# Patient Record
Sex: FEMALE | Race: WHITE | NOT HISPANIC OR LATINO | Employment: OTHER | URBAN - METROPOLITAN AREA
[De-identification: names, ages, dates, MRNs, and addresses within clinical notes are randomized per-mention and may not be internally consistent; named-entity substitution may affect disease eponyms.]

---

## 2018-03-12 ENCOUNTER — HISTORICAL (OUTPATIENT)
Dept: INFUSION THERAPY | Facility: HOSPITAL | Age: 83
End: 2018-03-12

## 2019-03-08 ENCOUNTER — HISTORICAL (OUTPATIENT)
Dept: ADMINISTRATIVE | Facility: HOSPITAL | Age: 84
End: 2019-03-08

## 2019-03-11 ENCOUNTER — HISTORICAL (OUTPATIENT)
Dept: ADMINISTRATIVE | Facility: HOSPITAL | Age: 84
End: 2019-03-11

## 2019-03-11 LAB
ABS NEUT (OLG): 6.52 X10(3)/MCL (ref 2.1–9.2)
ALBUMIN SERPL-MCNC: 3.6 GM/DL (ref 3.4–5)
ALBUMIN/GLOB SERPL: 1 RATIO (ref 1.1–2)
ALP SERPL-CCNC: 60 UNIT/L (ref 38–126)
ALT SERPL-CCNC: 13 UNIT/L (ref 12–78)
AST SERPL-CCNC: 9 UNIT/L (ref 15–37)
BASOPHILS # BLD AUTO: 0.1 X10(3)/MCL (ref 0–0.2)
BASOPHILS NFR BLD AUTO: 0.8 %
BILIRUB SERPL-MCNC: 0.5 MG/DL (ref 0.2–1)
BILIRUBIN DIRECT+TOT PNL SERPL-MCNC: 0.1 MG/DL (ref 0–0.5)
BILIRUBIN DIRECT+TOT PNL SERPL-MCNC: 0.4 MG/DL (ref 0–0.8)
BUN SERPL-MCNC: 21 MG/DL (ref 7–18)
CALCIUM SERPL-MCNC: 8.9 MG/DL (ref 8.5–10.1)
CHLORIDE SERPL-SCNC: 105 MMOL/L (ref 98–107)
CO2 SERPL-SCNC: 29 MMOL/L (ref 21–32)
CREAT SERPL-MCNC: 0.91 MG/DL (ref 0.55–1.02)
EOSINOPHIL # BLD AUTO: 0.4 X10(3)/MCL (ref 0–0.9)
EOSINOPHIL NFR BLD AUTO: 3.8 %
ERYTHROCYTE [DISTWIDTH] IN BLOOD BY AUTOMATED COUNT: 14.4 % (ref 11.5–17)
GLOBULIN SER-MCNC: 3.5 GM/DL (ref 2.4–3.5)
GLUCOSE SERPL-MCNC: 94 MG/DL (ref 74–106)
HCT VFR BLD AUTO: 41.7 % (ref 37–47)
HGB BLD-MCNC: 13 GM/DL (ref 12–16)
LYMPHOCYTES # BLD AUTO: 2.4 X10(3)/MCL (ref 0.6–4.6)
LYMPHOCYTES NFR BLD AUTO: 23.3 %
MCH RBC QN AUTO: 25.6 PG (ref 27–31)
MCHC RBC AUTO-ENTMCNC: 31.2 GM/DL (ref 33–36)
MCV RBC AUTO: 82.2 FL (ref 80–94)
MONOCYTES # BLD AUTO: 0.9 X10(3)/MCL (ref 0.1–1.3)
MONOCYTES NFR BLD AUTO: 8.8 %
NEUTROPHILS # BLD AUTO: 6.5 X10(3)/MCL (ref 2.1–9.2)
NEUTROPHILS NFR BLD AUTO: 62.6 %
PLATELET # BLD AUTO: 259 X10(3)/MCL (ref 130–400)
PMV BLD AUTO: 10.4 FL (ref 9.4–12.4)
POTASSIUM SERPL-SCNC: 3.8 MMOL/L (ref 3.5–5.1)
PROT SERPL-MCNC: 7.1 GM/DL (ref 6.4–8.2)
RBC # BLD AUTO: 5.07 X10(6)/MCL (ref 4.2–5.4)
SODIUM SERPL-SCNC: 139 MMOL/L (ref 136–145)
WBC # SPEC AUTO: 10.4 X10(3)/MCL (ref 4.5–11.5)

## 2019-09-11 ENCOUNTER — HISTORICAL (OUTPATIENT)
Dept: ADMINISTRATIVE | Facility: HOSPITAL | Age: 84
End: 2019-09-11

## 2019-09-19 ENCOUNTER — HISTORICAL (OUTPATIENT)
Dept: CARDIOLOGY | Facility: HOSPITAL | Age: 84
End: 2019-09-19

## 2019-12-09 ENCOUNTER — HISTORICAL (OUTPATIENT)
Dept: ADMINISTRATIVE | Facility: HOSPITAL | Age: 84
End: 2019-12-09

## 2019-12-17 ENCOUNTER — HISTORICAL (OUTPATIENT)
Dept: RADIOLOGY | Facility: HOSPITAL | Age: 84
End: 2019-12-17

## 2019-12-23 ENCOUNTER — HISTORICAL (OUTPATIENT)
Dept: LAB | Facility: HOSPITAL | Age: 84
End: 2019-12-23

## 2020-01-15 ENCOUNTER — HISTORICAL (OUTPATIENT)
Dept: RADIOLOGY | Facility: HOSPITAL | Age: 85
End: 2020-01-15

## 2020-03-06 ENCOUNTER — HISTORICAL (OUTPATIENT)
Dept: HEMATOLOGY/ONCOLOGY | Facility: CLINIC | Age: 85
End: 2020-03-06

## 2020-03-06 LAB
ABS NEUT (OLG): 6.07 X10(3)/MCL (ref 2.1–9.2)
ALBUMIN SERPL-MCNC: 3.6 GM/DL (ref 3.4–5)
ALBUMIN/GLOB SERPL: 1 {RATIO}
ALP SERPL-CCNC: 63 UNIT/L (ref 38–126)
ALT SERPL-CCNC: 19 UNIT/L (ref 12–78)
AST SERPL-CCNC: 13 UNIT/L (ref 15–37)
BASOPHILS # BLD AUTO: 0.1 X10(3)/MCL (ref 0–0.2)
BASOPHILS NFR BLD AUTO: 0.8 %
BILIRUB SERPL-MCNC: 0.5 MG/DL (ref 0.2–1)
BILIRUBIN DIRECT+TOT PNL SERPL-MCNC: 0.1 MG/DL (ref 0–0.2)
BILIRUBIN DIRECT+TOT PNL SERPL-MCNC: 0.4 MG/DL (ref 0–0.8)
BUN SERPL-MCNC: 17 MG/DL (ref 7–18)
CALCIUM SERPL-MCNC: 9.2 MG/DL (ref 8.5–10.1)
CHLORIDE SERPL-SCNC: 109 MMOL/L (ref 98–107)
CO2 SERPL-SCNC: 25 MMOL/L (ref 21–32)
CREAT SERPL-MCNC: 0.83 MG/DL (ref 0.55–1.02)
EOSINOPHIL # BLD AUTO: 0.4 X10(3)/MCL (ref 0–0.9)
EOSINOPHIL NFR BLD AUTO: 4.1 %
ERYTHROCYTE [DISTWIDTH] IN BLOOD BY AUTOMATED COUNT: 15.7 % (ref 11.5–17)
GLOBULIN SER-MCNC: 3.7 GM/DL (ref 2.4–3.5)
GLUCOSE SERPL-MCNC: 87 MG/DL (ref 74–106)
HCT VFR BLD AUTO: 40.7 % (ref 37–47)
HGB BLD-MCNC: 12.6 GM/DL (ref 12–16)
LYMPHOCYTES # BLD AUTO: 2.2 X10(3)/MCL (ref 0.6–4.6)
LYMPHOCYTES NFR BLD AUTO: 22.8 %
MCH RBC QN AUTO: 24.4 PG (ref 27–31)
MCHC RBC AUTO-ENTMCNC: 31 GM/DL (ref 33–36)
MCV RBC AUTO: 78.9 FL (ref 80–94)
MONOCYTES # BLD AUTO: 0.9 X10(3)/MCL (ref 0.1–1.3)
MONOCYTES NFR BLD AUTO: 9.2 %
NEUTROPHILS # BLD AUTO: 6.1 X10(3)/MCL (ref 2.1–9.2)
NEUTROPHILS NFR BLD AUTO: 62.5 %
PLATELET # BLD AUTO: 223 X10(3)/MCL (ref 130–400)
PMV BLD AUTO: 9.7 FL (ref 9.4–12.4)
POTASSIUM SERPL-SCNC: 4.1 MMOL/L (ref 3.5–5.1)
PROT SERPL-MCNC: 7.3 GM/DL (ref 6.4–8.2)
RBC # BLD AUTO: 5.16 X10(6)/MCL (ref 4.2–5.4)
SODIUM SERPL-SCNC: 141 MMOL/L (ref 136–145)
WBC # SPEC AUTO: 9.7 X10(3)/MCL (ref 4.5–11.5)

## 2020-12-21 ENCOUNTER — HISTORICAL (OUTPATIENT)
Dept: RADIOLOGY | Facility: HOSPITAL | Age: 85
End: 2020-12-21

## 2021-03-15 ENCOUNTER — HISTORICAL (OUTPATIENT)
Dept: ADMINISTRATIVE | Facility: HOSPITAL | Age: 86
End: 2021-03-15

## 2022-03-17 ENCOUNTER — HISTORICAL (OUTPATIENT)
Dept: LAB | Facility: HOSPITAL | Age: 87
End: 2022-03-17

## 2022-03-17 ENCOUNTER — HISTORICAL (OUTPATIENT)
Dept: ADMINISTRATIVE | Facility: HOSPITAL | Age: 87
End: 2022-03-17

## 2022-04-11 ENCOUNTER — HISTORICAL (OUTPATIENT)
Dept: ADMINISTRATIVE | Facility: HOSPITAL | Age: 87
End: 2022-04-11

## 2022-04-29 VITALS
SYSTOLIC BLOOD PRESSURE: 125 MMHG | DIASTOLIC BLOOD PRESSURE: 73 MMHG | BODY MASS INDEX: 25.86 KG/M2 | HEIGHT: 66 IN | WEIGHT: 160.94 LBS

## 2022-04-30 NOTE — PROGRESS NOTES
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 CT of the chest showed a suspicious right upper lobe mass. Bronchoscopy was nondiagnostic. CT-guided biopsy  showed no definite malignancy. Close observation and follow-up was recommended. CT PET scan 10/8/12 showed a hypermetabolic right upper lobe mass measuring 2.9 x 2.6 and meters with an SUV of 15.6. There were 2 smaller satellite lesions and bilateral borderline hilar lymph nodes. She underwent right upper lobectomy 10/23/12. Pathology revealed a 4 cm carcinosarcoma invading visceral pleura with clear bronchial margins. Right hilar node demonstrated sclerosed granuloma with no evidence of malignancy. 6 resected mediastinal nodes were negative for involvement. She was followed on surveillance by Dr. Velma Craft with no evidence of disease recurrence. Her last follow-up visit was 17.    She presents today accompanied by her daughter to establish ongoing Oncology follow-up. She is now nearly 5-1/2 years out from her initial diagnosis and surgical resection. She reports mild dyspnea with moderate exertion. No chest pain, cough, sputum production or hemoptysis. She denies any weight loss. Since her previous surveillance visit with Dr. Malone, she was diagnosed and treated for Lyme disease. She denies any hospitalizations.      Chief Complaint   I'm doing well      Review of Systems   Constitutional:  No weight loss, No fever, No chills, No sweats, No weakness, No fatigue.    Eye:  No icterus, No blurring, No  double vision.    Ear/Nose/Mouth/Throat:  No nasal congestion, No sore throat.    Respiratory:  Shortness of breath (Mild MITCHELL), No cough, No sputum production, No hemoptysis, No wheezing.    Cardiovascular:  No chest pain, No palpitations, No tachycardia.    Gastrointestinal:  No nausea, No vomiting, No diarrhea, No constipation, No abdominal pain.    Genitourinary:  No dysuria, No hematuria, No change in urine stream.    Hematology/Lymphatics:  No bruising tendency, No bleeding tendency, No swollen lymph glands.    Musculoskeletal:  Joint pain, No lower extremity edema, No back pain.    Integumentary:  No rash, No pruritus, No skin lesion.    Neurologic:  Alert and oriented X4, No abnormal balance, No confusion, No numbness, No tingling.    Psychiatric:  No anxiety, No depression.       Health Status   Allergies:    Allergic Reactions (Selected)  Severity Not Documented  Dilaudid- Nausea.  Morphine- Rash.  Penicillin- Rash.  Red dye- No reactions were documented.  Strawberries- No reactions were documented.   Current medications:  (Selected)   Documented Medications  Documented  Fish Oil oral capsule: 1 cap(s), Oral, Daily, 0 Refill(s)  LORAzepam 0.5 mg oral tablet: 0.5 mg = 1 tab(s), Oral, BID, PRN for anxiety, 0 Refill(s)  Vitamin B12 1000 mcg oral tablet: 1,000 mcg = 1 tab(s), Oral, Daily, # 30 tab(s), 0 Refill(s)  acetaminophen 325 mg oral tablet: 650 mg = 2 tab(s), Oral, q4hr, PRN headache, 0 Refill(s)  aspirin 81 mg oral Delayed Release (EC) tablet: 81 mg = 1 tab(s), Oral, Daily, # 30 tab(s), 0 Refill(s)  bisoprolol 5 mg oral tablet: 10 mg = 2 tab(s), Oral, BID, 0 Refill(s)  cholecalciferol 400 intl units oral tablet: 400 IntUnit = 1 tab(s), Oral, Daily, # 30 tab(s), 0 Refill(s)  citalopram 20 mg oral tablet: 40 mg = 2 tab(s), Oral, Daily, # 30 tab(s), 0 Refill(s)  levothyroxine 75 mcg (0.075 mg) oral tablet: 75 mcg = 1 tab(s), Oral, Daily, # 30 tab(s), 0 Refill(s)  losartan 25 mg oral tablet: 25 mg = 1  tab(s), Oral, Daily, # 30 tab(s), 0 Refill(s)      Histories     PMHx:  HTN, hyperlipidemia, hypothyroidism, A-fib, MVP, arthritis, kidney stones, pelvic mass    PSHx:  Thyroidectomy, cholecystectomy, right total knee replacement, appendectomy, hysterectomy, right upper lobectomy 10/12, cataracts    SH:  Lifetime nonsmoker, no significant alcohol use. 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    FH:  Her mother had ovarian cancer, a brother had lung cancer.      Physical Examination   Vital Signs   3/12/2018 13:22 CDT      Temperature Oral          36.8 DegC                             Temperature Oral (calculated)             98.24 DegF                             Peripheral Pulse Rate     79 bpm                             Systolic Blood Pressure   139 mmHg                             Diastolic Blood Pressure  74 mmHg     General:  Alert and oriented, Elderly, well-developed white female in no acute distress.    Eye:  Pupils are equal, round and reactive to light, Extraocular movements are intact, Normal conjunctiva, Sclera nonicteric.    HENT:  Normocephalic, Oral mucosa is moist, No pharyngeal erythema.    Neck:  Supple, Non-tender, No jugular venous distention, No lymphadenopathy.    Respiratory:  Respirations are non-labored, Breath sounds are equal, Lungs clear to auscultation throughout. Well-healed right thoracotomy incision.    Cardiovascular:  Normal rate, Regular rhythm, No murmur.    Gastrointestinal:  Soft, Non-tender, Non-distended, Normal bowel sounds, No palpable masses or organomegaly.    Lymphatics:  No lymphadenopathy neck, axilla, groin.    Musculoskeletal:  Normal range of motion, Normal strength, No lower extremity edema.    Integumentary:  Warm, Dry, No rash.    Neurologic:  Alert, Oriented, Normal sensory, Normal motor function, No focal deficits, Cranial Nerves II-XII are grossly intact.    ECOG Performance Scale: 1- Strenuous physical activity restricted; fully ambulatory and able  to carry out light work.      Review / Management   Laboratory Results   No new laboratory for review      Impression and Plan   IMPRESSION:  Stage IB carcinosarcoma of the right lung s/p resection 10/12 - RICHARD    PLAN:  Patient is now nearly 5-1/2 years out from her initial diagnosis and surgical resection for early stage lung cancer.  She takes the flu shot every year. She has never had the pneumococcal vaccine. She will receive Pneumovax in the office today.  She will be referred for a PA/lateral chest x-ray today for surveillance. I will notify her of any abnormal results.  Barring any problems, she will be scheduled to return in one year for a follow-up visit and clinical exam with a repeat chest x-ray.      MARGO WILLS M.D.      Other Physicians  Dr. Velma Freitas

## 2023-04-06 ENCOUNTER — HOSPITAL ENCOUNTER (EMERGENCY)
Facility: HOSPITAL | Age: 88
Discharge: HOME | End: 2023-04-06
Attending: STUDENT IN AN ORGANIZED HEALTH CARE EDUCATION/TRAINING PROGRAM
Payer: MEDICARE

## 2023-04-06 VITALS
RESPIRATION RATE: 21 BRPM | WEIGHT: 160 LBS | TEMPERATURE: 98 F | HEIGHT: 66 IN | HEART RATE: 83 BPM | SYSTOLIC BLOOD PRESSURE: 137 MMHG | DIASTOLIC BLOOD PRESSURE: 84 MMHG | BODY MASS INDEX: 25.71 KG/M2 | OXYGEN SATURATION: 96 %

## 2023-04-06 DIAGNOSIS — R11.2 NAUSEA AND VOMITING, UNSPECIFIED VOMITING TYPE: ICD-10-CM

## 2023-04-06 DIAGNOSIS — R42 DIZZINESS: ICD-10-CM

## 2023-04-06 DIAGNOSIS — R42 VERTIGO: Primary | ICD-10-CM

## 2023-04-06 LAB
ALBUMIN SERPL-MCNC: 4.3 G/DL (ref 3.4–4.8)
ALBUMIN/GLOB SERPL: 1 RATIO (ref 1.1–2)
ALP SERPL-CCNC: 57 UNIT/L (ref 40–150)
ALT SERPL-CCNC: 13 UNIT/L (ref 0–55)
APTT PPP: 31.7 SECONDS (ref 23.2–33.7)
AST SERPL-CCNC: 27 UNIT/L (ref 5–34)
BASOPHILS # BLD AUTO: 0.05 X10(3)/MCL (ref 0–0.2)
BASOPHILS NFR BLD AUTO: 0.4 %
BILIRUBIN DIRECT+TOT PNL SERPL-MCNC: 1.2 MG/DL
BNP BLD-MCNC: 230.2 PG/ML
BUN SERPL-MCNC: 15.7 MG/DL (ref 9.8–20.1)
CALCIUM SERPL-MCNC: 9.8 MG/DL (ref 8.4–10.2)
CHLORIDE SERPL-SCNC: 105 MMOL/L (ref 98–111)
CO2 SERPL-SCNC: 21 MMOL/L (ref 23–31)
CREAT SERPL-MCNC: 0.89 MG/DL (ref 0.55–1.02)
EOSINOPHIL # BLD AUTO: 0.03 X10(3)/MCL (ref 0–0.9)
EOSINOPHIL NFR BLD AUTO: 0.2 %
ERYTHROCYTE [DISTWIDTH] IN BLOOD BY AUTOMATED COUNT: 15.2 % (ref 11.5–17)
FLUAV AG UPPER RESP QL IA.RAPID: NOT DETECTED
FLUBV AG UPPER RESP QL IA.RAPID: NOT DETECTED
GFR SERPLBLD CREATININE-BSD FMLA CKD-EPI: >60 MLS/MIN/1.73/M2
GLOBULIN SER-MCNC: 4.2 GM/DL (ref 2.4–3.5)
GLUCOSE SERPL-MCNC: 123 MG/DL (ref 75–121)
HCT VFR BLD AUTO: 47.1 % (ref 37–47)
HGB BLD-MCNC: 15 G/DL (ref 12–16)
IMM GRANULOCYTES # BLD AUTO: 0.07 X10(3)/MCL (ref 0–0.04)
IMM GRANULOCYTES NFR BLD AUTO: 0.5 %
INR BLD: 1.07 (ref 0–1.3)
LYMPHOCYTES # BLD AUTO: 1.18 X10(3)/MCL (ref 0.6–4.6)
LYMPHOCYTES NFR BLD AUTO: 8.7 %
MAGNESIUM SERPL-MCNC: 1.6 MG/DL (ref 1.6–2.6)
MCH RBC QN AUTO: 25.6 PG (ref 27–31)
MCHC RBC AUTO-ENTMCNC: 31.8 G/DL (ref 33–36)
MCV RBC AUTO: 80.2 FL (ref 80–94)
MONOCYTES # BLD AUTO: 0.49 X10(3)/MCL (ref 0.1–1.3)
MONOCYTES NFR BLD AUTO: 3.6 %
NEUTROPHILS # BLD AUTO: 11.82 X10(3)/MCL (ref 2.1–9.2)
NEUTROPHILS NFR BLD AUTO: 86.6 %
NRBC BLD AUTO-RTO: 0 %
PLATELET # BLD AUTO: 155 X10(3)/MCL (ref 130–400)
PMV BLD AUTO: 10.6 FL (ref 7.4–10.4)
POTASSIUM SERPL-SCNC: 4.9 MMOL/L (ref 3.5–5.1)
PROT SERPL-MCNC: 8.5 GM/DL (ref 5.8–7.6)
PROTHROMBIN TIME: 13.8 SECONDS (ref 12.5–14.5)
RBC # BLD AUTO: 5.87 X10(6)/MCL (ref 4.2–5.4)
SARS-COV-2 RNA RESP QL NAA+PROBE: NOT DETECTED
SODIUM SERPL-SCNC: 137 MMOL/L (ref 132–146)
TROPONIN I SERPL-MCNC: <0.01 NG/ML (ref 0–0.04)
WBC # SPEC AUTO: 13.6 X10(3)/MCL (ref 4.5–11.5)

## 2023-04-06 PROCEDURE — 96374 THER/PROPH/DIAG INJ IV PUSH: CPT

## 2023-04-06 PROCEDURE — 80053 COMPREHEN METABOLIC PANEL: CPT | Performed by: PHYSICIAN ASSISTANT

## 2023-04-06 PROCEDURE — 93005 ELECTROCARDIOGRAM TRACING: CPT

## 2023-04-06 PROCEDURE — 93010 EKG 12-LEAD: ICD-10-PCS | Mod: ,,, | Performed by: INTERNAL MEDICINE

## 2023-04-06 PROCEDURE — 83735 ASSAY OF MAGNESIUM: CPT | Performed by: PHYSICIAN ASSISTANT

## 2023-04-06 PROCEDURE — 83880 ASSAY OF NATRIURETIC PEPTIDE: CPT | Performed by: PHYSICIAN ASSISTANT

## 2023-04-06 PROCEDURE — 99285 EMERGENCY DEPT VISIT HI MDM: CPT | Mod: 25

## 2023-04-06 PROCEDURE — 93010 ELECTROCARDIOGRAM REPORT: CPT | Mod: ,,, | Performed by: INTERNAL MEDICINE

## 2023-04-06 PROCEDURE — 84484 ASSAY OF TROPONIN QUANT: CPT | Performed by: PHYSICIAN ASSISTANT

## 2023-04-06 PROCEDURE — 85730 THROMBOPLASTIN TIME PARTIAL: CPT | Performed by: PHYSICIAN ASSISTANT

## 2023-04-06 PROCEDURE — 63600175 PHARM REV CODE 636 W HCPCS: Performed by: PHYSICIAN ASSISTANT

## 2023-04-06 PROCEDURE — 0240U COVID/FLU A&B PCR: CPT | Performed by: PHYSICIAN ASSISTANT

## 2023-04-06 PROCEDURE — 85610 PROTHROMBIN TIME: CPT | Performed by: PHYSICIAN ASSISTANT

## 2023-04-06 PROCEDURE — 85025 COMPLETE CBC W/AUTO DIFF WBC: CPT | Performed by: PHYSICIAN ASSISTANT

## 2023-04-06 RX ORDER — ONDANSETRON 2 MG/ML
4 INJECTION INTRAMUSCULAR; INTRAVENOUS
Status: COMPLETED | OUTPATIENT
Start: 2023-04-06 | End: 2023-04-06

## 2023-04-06 RX ORDER — ONDANSETRON 4 MG/1
4 TABLET, ORALLY DISINTEGRATING ORAL EVERY 8 HOURS PRN
Qty: 10 TABLET | Refills: 0 | OUTPATIENT
Start: 2023-04-06 | End: 2023-04-06 | Stop reason: SDUPTHER

## 2023-04-06 RX ORDER — ONDANSETRON 4 MG/1
4 TABLET, ORALLY DISINTEGRATING ORAL EVERY 8 HOURS PRN
Qty: 10 TABLET | Refills: 0 | Status: SHIPPED | OUTPATIENT
Start: 2023-04-06

## 2023-04-06 RX ADMIN — ONDANSETRON 4 MG: 2 INJECTION INTRAMUSCULAR; INTRAVENOUS at 02:04

## 2023-04-06 NOTE — ED PROVIDER NOTES
Encounter Date: 4/6/2023    SCRIBE #1 NOTE: I, Kathie Arnold, am scribing for, and in the presence of,  Jimi Wesley MD. I have scribed the following portions of the note - Other sections scribed: HPI, ROS, PE.     History     Chief Complaint   Patient presents with    Vomiting     C/o vomiting, weakness & dizziness that started last night. Also reports intermittent chest pain & SOB. Denies abdominal pain. On Eliquis. Hx CHF & MI x2.     Patient is a 89 year old female with a history of A-fib, HTN, and CHF is presenting to the ED for vomiting. The pt states that she has had episode of vomiting and dizziness beginning around midnight last night. She states that it has since improved. She reports having history of vertigo. She denies any headache, chest pain, new shortness of breath, fever, diarrhea, or dysuria. The pt is currently on Eliquis and meclizine. She denies taking any meclizine today.     The history is provided by the patient and a caregiver.   Vomiting   This is a new problem. The current episode started today. The problem occurs 2 - 4 times per day. The problem has been resolved. Pertinent negatives include no abdominal pain and no fever.   Review of patient's allergies indicates:   Allergen Reactions    Sulfa (sulfonamide antibiotics)     Penicillins Rash     History reviewed. No pertinent past medical history.  History reviewed. No pertinent surgical history.  History reviewed. No pertinent family history.     Review of Systems   Constitutional:  Negative for fever.   HENT:  Negative for sore throat.    Eyes:  Negative for visual disturbance.   Respiratory:  Negative for shortness of breath.    Cardiovascular:  Negative for chest pain.   Gastrointestinal:  Positive for vomiting. Negative for abdominal pain.   Genitourinary:  Negative for dysuria.   Musculoskeletal:  Negative for joint swelling.   Skin:  Negative for rash.   Neurological:  Positive for dizziness. Negative for weakness.    Psychiatric/Behavioral:  Negative for confusion.      Physical Exam     Initial Vitals   BP Pulse Resp Temp SpO2   04/06/23 1307 04/06/23 1304 04/06/23 1304 04/06/23 1304 04/06/23 1304   (!) 163/85 79 18 97.7 °F (36.5 °C) 97 %      MAP       --                Physical Exam    Nursing note and vitals reviewed.  Constitutional: She appears well-developed and well-nourished.   HENT:   Head: Normocephalic and atraumatic.   Eyes: EOM are normal. Pupils are equal, round, and reactive to light.   Neck:   Normal range of motion.  Cardiovascular:  Normal rate, normal heart sounds and intact distal pulses. A regularly irregular rhythm present.           No murmur heard.  Pulmonary/Chest: Breath sounds normal. No respiratory distress. She has no wheezes. She has no rales.   Abdominal: Abdomen is soft. She exhibits no distension. There is no abdominal tenderness. There is no rebound.   Musculoskeletal:         General: No tenderness or edema. Normal range of motion.      Cervical back: Normal range of motion.     Neurological: She is alert. She has normal strength. She displays normal reflexes. No cranial nerve deficit. GCS score is 15. GCS eye subscore is 4. GCS verbal subscore is 5. GCS motor subscore is 6.   5/5 upper and lower extremity strength.  Normal cerebellar function.  Normal finger-nose.  Normal gait.   Skin: Skin is warm and dry. Capillary refill takes less than 2 seconds. No rash noted. No erythema.   Psychiatric: She has a normal mood and affect.       ED Course   Procedures  Labs Reviewed   COMPREHENSIVE METABOLIC PANEL - Abnormal; Notable for the following components:       Result Value    Carbon Dioxide 21 (*)     Glucose Level 123 (*)     Protein Total 8.5 (*)     Globulin 4.2 (*)     Albumin/Globulin Ratio 1.0 (*)     All other components within normal limits   B-TYPE NATRIURETIC PEPTIDE - Abnormal; Notable for the following components:    Natriuretic Peptide 230.2 (*)     All other components within  normal limits   CBC WITH DIFFERENTIAL - Abnormal; Notable for the following components:    WBC 13.6 (*)     RBC 5.87 (*)     Hct 47.1 (*)     MCH 25.6 (*)     MCHC 31.8 (*)     MPV 10.6 (*)     Neut # 11.82 (*)     IG# 0.07 (*)     All other components within normal limits   TROPONIN I - Normal   COVID/FLU A&B PCR - Normal    Narrative:     The Xpert Xpress SARS-CoV-2/FLU/RSV plus is a rapid, multiplexed real-time PCR test intended for the simultaneous qualitative detection and differentiation of SARS-CoV-2, Influenza A, Influenza B, and respiratory syncytial virus (RSV) viral RNA in either nasopharyngeal swab or nasal swab specimens.         APTT - Normal   PROTIME-INR - Normal   MAGNESIUM - Normal   CBC W/ AUTO DIFFERENTIAL    Narrative:     The following orders were created for panel order CBC auto differential.  Procedure                               Abnormality         Status                     ---------                               -----------         ------                     CBC with Differential[[id number]]        Abnormal            Final result                 Please view results for these tests on the individual orders.     EKG Readings: (Independently Interpreted)   Initial Reading: No STEMI. Rhythm: Atrial Fibrillation. Heart Rate: 82. Ectopy: No Ectopy. Conduction: Normal. ST Segments: Normal ST Segments. T Waves: Normal. Axis: Left Axis Deviation.   1308   ECG Results              EKG 12-lead (Final result)  Result time 04/06/23 16:55:32      Final result by Interface, Lab In [facility] (04/06/23 16:55:32)                   Narrative:    Test Reason : R42,    Vent. Rate : 082 BPM     Atrial Rate : 000 BPM     P-R Int : 000 ms          QRS Dur : 074 ms      QT Int : 398 ms       P-R-T Axes : 000 -33 031 degrees     QTc Int : 464 ms    Atrial fibrillation  Left axis deviation  Septal infarct ,age undetermined  Abnormal ECG  No previous ECGs available  Confirmed by Saeed Mercado MD ([id number]) on 4/6/2023  4:55:23 PM    Referred By:             Confirmed By:Saeed Mercado MD                                  Imaging Results              CT Head Without Contrast (Final result)  Result time 04/06/23 13:35:41      Final result by Pascual Anderson MD (04/06/23 13:35:41)                   Impression:      No acute intracranial abnormality identified.  Findings of chronic microvascular ischemic disease.      Electronically signed by: Pascual Anderson  Date:    04/06/2023  Time:    13:35               Narrative:    EXAMINATION:  CT HEAD WITHOUT CONTRAST    CLINICAL HISTORY:  Dizziness, persistent/recurrent, cardiac or vascular cause suspected;    TECHNIQUE:  Low dose axial images were obtained through the head.  Coronal and sagittal reformations were also performed. Contrast was not administered.    Automatic exposure control was utilized to reduce the patient's radiation dose.    DLP= 959    COMPARISON:  11/14/2020    FINDINGS:  No acute intracranial hemorrhage, edema or mass. No acute parenchymal abnormality.    Diffuse cerebral atrophy with concordant ventricular enlargement.    There is normal gray white differentiation.    The osseous structures are normal.    The mastoid air cells are clear.    The auditory canals are patent bilaterally.    The globes and orbital contents are normal bilaterally.    The visualized maxillary, ethmoid and sphenoid sinuses are clear.                                       X-Ray Chest PA And Lateral (Final result)  Result time 04/06/23 13:20:07      Final result by Jennifer Guzman MD (04/06/23 13:20:07)                   Impression:      No acute abnormality of the chest.      Electronically signed by: Jennifer Guzman  Date:    04/06/2023  Time:    13:20               Narrative:    EXAMINATION:  XR CHEST PA AND LATERAL    CLINICAL HISTORY:  Chest pain, unspecified    TECHNIQUE:  PA and lateral chest radiographs    COMPARISON:  Chest x-ray dated 03/17/2022    FINDINGS:  The heart is normal in  size.  There are stable interstitial opacities in the lung bases, likely chronic.  There is no new focal airspace consolidation.  There is no pleural effusion or visible pneumothorax.  There are degenerative changes of the thoracic spine.                                       Medications   ondansetron injection 4 mg (4 mg Intravenous Given 4/6/23 1415)     Medical Decision Making:   History:   I obtained history from: someone other than patient.       <> Summary of History: Patient's daughter at bedside.  Old Medical Records: I decided to obtain old medical records.  Old Records Summarized: records from clinic visits and records from previous admission(s).       <> Summary of Records: Reviewed previous records, patient is on anticoagulation with Eliquis for AFib  Initial Assessment:   Dizziness resolved  Differential Diagnosis:   Ddx includes but not limited to. Vertigo, CVA, Dysrhythmia, N/V, GERD, Gastritis  Independently Interpreted Test(s):   I have ordered and independently interpreted X-rays - see prior notes.  I have ordered and independently interpreted EKG Reading(s) - see prior notes  Clinical Tests:   Lab Tests: Ordered and Reviewed  Radiological Study: Ordered and Reviewed  Medical Tests: Ordered and Reviewed  ED Management:  Patient is a 89-year-old female who presents to the emergency department for episode of nausea, vomiting, dizziness that occurred last night.  She has a history of vertigo and sometimes takes meclizine.  She did not taken any meclizine this morning.  On arrival patient's symptoms have improved, given Zofran able tolerate p.o..  Denies any current dizziness.  No acute focal neuro deficits appreciated on examination.  5/5 upper and lower motor strength.  NIH of 0.  EKG with rate controlled AFib unchanged from previous.  Chest x-ray without any acute findings.  CT of the head without any acute abnormalities, ICH, or any new CVA.  Patient is maximally optimized on anticoagulation for  stroke prevention.  As the patient is asymptomatic, back to her baseline, no acute neuro deficits, denying any chest pain shortness of breath, no dysrhythmia noted on telemetry monitoring or any other symptoms, can continue outpatient management.  Patient has meclizine at home should she have any recurrence of her vertigo.  Will prescribe Zofran for any nausea or vomiting.  All results discussed with the patient and family.  Discussed need for follow-up with primary care physician.  Discussed return precautions.  Verbalized understanding agreed to plan.  Hemodynamically stable for continued outpatient management strict return precautions.    Medical Decision Making  Problems Addressed:  Dizziness: acute illness or injury that poses a threat to life or bodily functions  Nausea and vomiting, unspecified vomiting type: acute illness or injury that poses a threat to life or bodily functions  Vertigo: acute illness or injury that poses a threat to life or bodily functions    Amount and/or Complexity of Data Reviewed  Labs: ordered. Decision-making details documented in ED Course.  Radiology: ordered and independent interpretation performed.  ECG/medicine tests: ordered and independent interpretation performed.    Risk  OTC drugs.  Prescription drug management.  Parenteral controlled substances.             Scribe Attestation:   Scribe #1: I performed the above scribed service and the documentation accurately describes the services I performed. I attest to the accuracy of the note.    Attending Attestation:           Physician Attestation for Scribe:  Physician Attestation Statement for Scribe #1: I, Jimi Wesley MD, reviewed documentation, as scribed by Kathie Arnold in my presence, and it is both accurate and complete.                        Clinical Impression:   Final diagnoses:  [R42] Dizziness  [R42] Vertigo (Primary)  [R11.2] Nausea and vomiting, unspecified vomiting type        ED Disposition Condition     Discharge Stable          ED Prescriptions       Medication Sig Dispense Start Date End Date Auth. Provider    ondansetron (ZOFRAN-ODT) 4 MG TbDL  (Status: Discontinued) Take 1 tablet (4 mg total) by mouth every 8 (eight) hours as needed. 10 tablet 4/6/2023 4/6/2023 Jimi Wesley MD    ondansetron (ZOFRAN-ODT) 4 MG TbDL Take 1 tablet (4 mg total) by mouth every 8 (eight) hours as needed. 10 tablet 4/6/2023 -- Jimi Wesley MD          Follow-up Information    None          Jimi Wesley MD  04/07/23 [id number]

## 2023-04-06 NOTE — DISCHARGE INSTRUCTIONS
Follow-up with your primary care physician.      Continue taking meclizine if any dizziness.      Take Zofran as needed for nausea and vomiting.    Return to the emergency department if any weakness, numbness, headache, persistent vomiting, fever, difficulty with speech, or any other symptoms.

## 2023-04-06 NOTE — FIRST PROVIDER EVALUATION
"Medical screening examination initiated.  I have conducted a focused provider triage encounter, findings are as follows:    Chief Complaint   Patient presents with    Vomiting     C/o vomiting, weakness & dizziness that started last night. Also reports intermittent chest pain & SOB. Denies abdominal pain. On Eliquis. Hx CHF & MI x2.     Brief history of present illness:  89 y.o. female presents to the ED with n/v, weakness, and dizziness that started last night. Also notes left sided chest pain and SOB. On Eliquis. Hx of CHF and Afib.     Vitals:    04/06/23 1304 04/06/23 1307   BP:  (!) 163/85   Pulse: 79    Resp: 18    Temp: 97.7 °F (36.5 °C)    TempSrc: Oral    SpO2: 97%    Weight: 72.6 kg (160 lb)    Height: 5' 6" (1.676 m)      Pertinent physical exam:  Awake, alert, , non-labored respirations    Brief workup plan:  labs, EKG    Preliminary workup initiated; this workup will be continued and followed by the physician or advanced practice provider that is assigned to the patient when roomed.  "

## 2023-05-24 NOTE — OP NOTE
DATE OF SURGERY:    09/19/2019    SURGEON:  Byron Nelson MD    PROCEDURE:    1. Left heart catheterization.  2. Selective coronary angiography including injection of the left and right coronaries.  3. Left ventriculogram.    PREOPERATIVE DIAGNOSES:    1. Coronary artery disease.  2. Angina pectoris.    POSTOPERATIVE DIAGNOSES:    1. Nonobstructive coronary disease.  2. Angina pectoris.    SUMMARY:  Anesthesia was obtained by subcutaneous infiltration of plain Xylocaine over the right femoral artery.  A 4-Vietnamese sheath was placed in the usual fashion, and diagnostic catheters were used to take standard angiographic views.    FINDINGS:    1. The left coronary artery exhibits mild plaque and calcification throughout.  There is segmental 60% stenosis at the origin of the 1st diagonal.  2. The right coronary is a large diameter vessel with a large posterolateral branch.  There is mild plaque with no significant stenosis.  3. The ventriculogram demonstrates normal wall motion with an ejection fraction of 70%.  4. There is no gradient across the aortic valve.    5. The patient will be referred for a GI evaluation on antacid therapy.  Aspirin will be discontinued at this time.  There were no complications of the procedure.        ______________________________  Byron Nelson MD    JJM/UM  DD:  09/19/2019  Time:  11:33AM  DT:  09/19/2019  Time:  11:49AM  Job #:  [id number]      No

## 2023-07-19 ENCOUNTER — HOSPITAL ENCOUNTER (EMERGENCY)
Facility: HOSPITAL | Age: 88
Discharge: HOME | End: 2023-07-20
Attending: INTERNAL MEDICINE
Payer: MEDICARE

## 2023-07-19 DIAGNOSIS — N39.0 URINARY TRACT INFECTION WITH HEMATURIA, SITE UNSPECIFIED: Primary | ICD-10-CM

## 2023-07-19 DIAGNOSIS — R31.9 URINARY TRACT INFECTION WITH HEMATURIA, SITE UNSPECIFIED: Primary | ICD-10-CM

## 2023-07-19 DIAGNOSIS — R53.1 GENERALIZED WEAKNESS: ICD-10-CM

## 2023-07-19 DIAGNOSIS — R11.10 VOMITING: ICD-10-CM

## 2023-07-19 LAB
ALBUMIN SERPL-MCNC: 3.9 G/DL (ref 3.4–4.8)
ALBUMIN/GLOB SERPL: 1 RATIO (ref 1.1–2)
ALP SERPL-CCNC: 60 UNIT/L (ref 40–150)
ALT SERPL-CCNC: 10 UNIT/L (ref 0–55)
AMORPH URATE CRY URNS QL MICRO: ABNORMAL /HPF
AMYLASE SERPL-CCNC: 86 UNIT/L (ref 20–160)
APPEARANCE UR: ABNORMAL
AST SERPL-CCNC: 14 UNIT/L (ref 5–34)
BACTERIA #/AREA URNS AUTO: ABNORMAL /HPF
BASOPHILS # BLD AUTO: 0.06 X10(3)/MCL
BASOPHILS NFR BLD AUTO: 0.5 %
BILIRUB UR QL STRIP.AUTO: NEGATIVE
BILIRUBIN DIRECT+TOT PNL SERPL-MCNC: 0.7 MG/DL
BNP BLD-MCNC: 328.4 PG/ML
BUN SERPL-MCNC: 15 MG/DL (ref 9.8–20.1)
CALCIUM SERPL-MCNC: 9.5 MG/DL (ref 8.4–10.2)
CHLORIDE SERPL-SCNC: 106 MMOL/L (ref 98–111)
CO2 SERPL-SCNC: 26 MMOL/L (ref 23–31)
COLOR UR: ABNORMAL
CREAT SERPL-MCNC: 0.84 MG/DL (ref 0.55–1.02)
EOSINOPHIL # BLD AUTO: 0.05 X10(3)/MCL (ref 0–0.9)
EOSINOPHIL NFR BLD AUTO: 0.4 %
ERYTHROCYTE [DISTWIDTH] IN BLOOD BY AUTOMATED COUNT: 14.6 % (ref 11.5–17)
GFR SERPLBLD CREATININE-BSD FMLA CKD-EPI: >60 MLS/MIN/1.73/M2
GLOBULIN SER-MCNC: 3.8 GM/DL (ref 2.4–3.5)
GLUCOSE SERPL-MCNC: 172 MG/DL (ref 75–121)
GLUCOSE UR QL STRIP.AUTO: NEGATIVE
HCT VFR BLD AUTO: 44.3 % (ref 37–47)
HGB BLD-MCNC: 13.6 G/DL (ref 12–16)
IMM GRANULOCYTES # BLD AUTO: 0.09 X10(3)/MCL (ref 0–0.04)
IMM GRANULOCYTES NFR BLD AUTO: 0.7 %
KETONES UR QL STRIP.AUTO: NEGATIVE
LACTATE SERPL-SCNC: 1.7 MMOL/L (ref 0.5–2.2)
LEUKOCYTE ESTERASE UR QL STRIP.AUTO: NEGATIVE
LIPASE SERPL-CCNC: 34 U/L
LYMPHOCYTES # BLD AUTO: 1.09 X10(3)/MCL (ref 0.6–4.6)
LYMPHOCYTES NFR BLD AUTO: 8.7 %
MAGNESIUM SERPL-MCNC: 1.78 MG/DL (ref 1.6–2.6)
MCH RBC QN AUTO: 25 PG (ref 27–31)
MCHC RBC AUTO-ENTMCNC: 30.7 G/DL (ref 33–36)
MCV RBC AUTO: 81.6 FL (ref 80–94)
MONOCYTES # BLD AUTO: 0.61 X10(3)/MCL (ref 0.1–1.3)
MONOCYTES NFR BLD AUTO: 4.9 %
MUCOUS THREADS URNS QL MICRO: ABNORMAL /LPF
NEUTROPHILS # BLD AUTO: 10.67 X10(3)/MCL (ref 2.1–9.2)
NEUTROPHILS NFR BLD AUTO: 84.8 %
NITRITE UR QL STRIP.AUTO: POSITIVE
PH UR STRIP.AUTO: 5.5 [PH]
PLATELET # BLD AUTO: 188 X10(3)/MCL (ref 130–400)
PMV BLD AUTO: 11.5 FL (ref 7.4–10.4)
POTASSIUM SERPL-SCNC: 4 MMOL/L (ref 3.5–5.1)
PROT SERPL-MCNC: 7.7 GM/DL (ref 5.8–7.6)
PROT UR QL STRIP.AUTO: ABNORMAL
RBC # BLD AUTO: 5.43 X10(6)/MCL (ref 4.2–5.4)
RBC #/AREA URNS AUTO: ABNORMAL /HPF
RBC UR QL AUTO: ABNORMAL
SODIUM SERPL-SCNC: 143 MMOL/L (ref 132–146)
SP GR UR STRIP.AUTO: >=1.03
SQUAMOUS #/AREA URNS AUTO: ABNORMAL /HPF
TROPONIN I SERPL-MCNC: <0.01 NG/ML (ref 0–0.04)
URATE CRY URNS QL MICRO: ABNORMAL /HPF
UROBILINOGEN UR STRIP-ACNC: 0.2
WBC # SPEC AUTO: 12.57 X10(3)/MCL (ref 4.5–11.5)
WBC #/AREA URNS AUTO: ABNORMAL /HPF

## 2023-07-19 PROCEDURE — 99284 EMERGENCY DEPT VISIT MOD MDM: CPT | Mod: 25

## 2023-07-19 PROCEDURE — 81001 URINALYSIS AUTO W/SCOPE: CPT | Performed by: INTERNAL MEDICINE

## 2023-07-19 PROCEDURE — 87186 SC STD MICRODIL/AGAR DIL: CPT | Performed by: INTERNAL MEDICINE

## 2023-07-19 PROCEDURE — 63600175 PHARM REV CODE 636 W HCPCS: Performed by: INTERNAL MEDICINE

## 2023-07-19 PROCEDURE — 83605 ASSAY OF LACTIC ACID: CPT | Performed by: INTERNAL MEDICINE

## 2023-07-19 PROCEDURE — 83880 ASSAY OF NATRIURETIC PEPTIDE: CPT | Performed by: INTERNAL MEDICINE

## 2023-07-19 PROCEDURE — 84484 ASSAY OF TROPONIN QUANT: CPT | Performed by: INTERNAL MEDICINE

## 2023-07-19 PROCEDURE — 96375 TX/PRO/DX INJ NEW DRUG ADDON: CPT

## 2023-07-19 PROCEDURE — 83690 ASSAY OF LIPASE: CPT | Performed by: INTERNAL MEDICINE

## 2023-07-19 PROCEDURE — 85025 COMPLETE CBC W/AUTO DIFF WBC: CPT | Performed by: INTERNAL MEDICINE

## 2023-07-19 PROCEDURE — 82150 ASSAY OF AMYLASE: CPT | Performed by: INTERNAL MEDICINE

## 2023-07-19 PROCEDURE — 83735 ASSAY OF MAGNESIUM: CPT | Performed by: INTERNAL MEDICINE

## 2023-07-19 PROCEDURE — 96374 THER/PROPH/DIAG INJ IV PUSH: CPT

## 2023-07-19 PROCEDURE — 87088 URINE BACTERIA CULTURE: CPT | Performed by: INTERNAL MEDICINE

## 2023-07-19 PROCEDURE — 25000003 PHARM REV CODE 250: Performed by: INTERNAL MEDICINE

## 2023-07-19 PROCEDURE — 80053 COMPREHEN METABOLIC PANEL: CPT | Performed by: INTERNAL MEDICINE

## 2023-07-19 RX ORDER — SODIUM CHLORIDE 9 MG/ML
1000 INJECTION, SOLUTION INTRAVENOUS
Status: COMPLETED | OUTPATIENT
Start: 2023-07-19 | End: 2023-07-19

## 2023-07-19 RX ORDER — HYDRALAZINE HYDROCHLORIDE 20 MG/ML
10 INJECTION INTRAMUSCULAR; INTRAVENOUS
Status: COMPLETED | OUTPATIENT
Start: 2023-07-19 | End: 2023-07-19

## 2023-07-19 RX ORDER — LEVOFLOXACIN 5 MG/ML
500 INJECTION, SOLUTION INTRAVENOUS ONCE
Status: COMPLETED | OUTPATIENT
Start: 2023-07-20 | End: 2023-07-20

## 2023-07-19 RX ORDER — ONDANSETRON 2 MG/ML
4 INJECTION INTRAMUSCULAR; INTRAVENOUS
Status: COMPLETED | OUTPATIENT
Start: 2023-07-19 | End: 2023-07-19

## 2023-07-19 RX ORDER — CLONIDINE HYDROCHLORIDE 0.2 MG/1
0.2 TABLET ORAL
Status: COMPLETED | OUTPATIENT
Start: 2023-07-19 | End: 2023-07-19

## 2023-07-19 RX ADMIN — CLONIDINE HYDROCHLORIDE 0.2 MG: 0.2 TABLET ORAL at 09:07

## 2023-07-19 RX ADMIN — LEVOFLOXACIN 500 MG: 500 INJECTION, SOLUTION INTRAVENOUS at 11:07

## 2023-07-19 RX ADMIN — HYDRALAZINE HYDROCHLORIDE 10 MG: 20 INJECTION, SOLUTION INTRAMUSCULAR; INTRAVENOUS at 10:07

## 2023-07-19 RX ADMIN — ONDANSETRON 4 MG: 2 INJECTION INTRAMUSCULAR; INTRAVENOUS at 10:07

## 2023-07-19 RX ADMIN — SODIUM CHLORIDE 1000 ML: 9 INJECTION, SOLUTION INTRAVENOUS at 10:07

## 2023-07-20 VITALS
TEMPERATURE: 98 F | BODY MASS INDEX: 28.25 KG/M2 | SYSTOLIC BLOOD PRESSURE: 131 MMHG | WEIGHT: 175 LBS | OXYGEN SATURATION: 96 % | DIASTOLIC BLOOD PRESSURE: 74 MMHG | RESPIRATION RATE: 16 BRPM | HEART RATE: 78 BPM

## 2023-07-20 PROCEDURE — 63600175 PHARM REV CODE 636 W HCPCS: Performed by: INTERNAL MEDICINE

## 2023-07-20 PROCEDURE — 25000003 PHARM REV CODE 250: Performed by: INTERNAL MEDICINE

## 2023-07-20 RX ORDER — ONDANSETRON 4 MG/1
4 TABLET, ORALLY DISINTEGRATING ORAL
Status: COMPLETED | OUTPATIENT
Start: 2023-07-20 | End: 2023-07-20

## 2023-07-20 RX ORDER — NITROFURANTOIN 25; 75 MG/1; MG/1
100 CAPSULE ORAL 2 TIMES DAILY
Qty: 10 CAPSULE | Refills: 0 | Status: SHIPPED | OUTPATIENT
Start: 2023-07-20 | End: 2023-07-25

## 2023-07-20 RX ADMIN — ONDANSETRON 4 MG: 4 TABLET, ORALLY DISINTEGRATING ORAL at 12:07

## 2023-07-20 NOTE — ED PROVIDER NOTES
Encounter Date: 7/19/2023       History     Chief Complaint   Patient presents with    Weakness     Weakness and vomiting starting tonight, pt reports missing dose of blood pressure medicine, tried taking dose tonight but vomited immediately after.      89-year-old white female presents emergency department complaining of weakness and nausea vomiting after returning from [place]  She states she started feeling weak when she got back from [place] and then try to take her night blood pressure meds and began vomiting so she came to the emergency department for evaluation.  She does report she had 3 bowel movements prior to coming to the ER    Review of patient's allergies indicates:   Allergen Reactions    Sulfa (sulfonamide antibiotics)     Penicillins Rash     Past Medical History:   Diagnosis Date    Cancer     Hypertension      History reviewed. No pertinent surgical history.  History reviewed. No pertinent family history.  Social History     Tobacco Use    Smoking status: Never    Smokeless tobacco: Never   Substance Use Topics    Alcohol use: Never    Drug use: Never     Review of Systems   Constitutional: Negative.  Negative for activity change, appetite change, chills, diaphoresis, fatigue, fever and unexpected weight change.   HENT: Negative.  Negative for congestion, dental problem, drooling, ear discharge, ear pain, facial swelling, hearing loss, mouth sores, nosebleeds, postnasal drip, rhinorrhea, sinus pressure, sinus pain, sneezing, sore throat, tinnitus, trouble swallowing and voice change.    Eyes: Negative.  Negative for photophobia, pain, discharge, redness, itching and visual disturbance.   Respiratory: Negative.  Negative for apnea, cough, choking, chest tightness, shortness of breath, wheezing and stridor.    Cardiovascular: Negative.  Negative for chest pain, palpitations and leg swelling.   Gastrointestinal:  Positive for vomiting. Negative for abdominal distention, abdominal pain, anal  bleeding, blood in stool, constipation, diarrhea, nausea and rectal pain.   Endocrine: Negative.  Negative for cold intolerance, heat intolerance, polydipsia, polyphagia and polyuria.   Genitourinary: Negative.  Negative for decreased urine volume, difficulty urinating, dyspareunia, dysuria, enuresis, flank pain, frequency, genital sores, hematuria, menstrual problem, pelvic pain, urgency, vaginal bleeding, vaginal discharge and vaginal pain.   Musculoskeletal: Negative.  Negative for arthralgias, back pain, gait problem, joint swelling, myalgias, neck pain and neck stiffness.   Skin: Negative.  Negative for color change, pallor, rash and wound.   Allergic/Immunologic: Negative.  Negative for environmental allergies, food allergies and immunocompromised state.   Neurological: Negative.  Negative for dizziness, tremors, seizures, syncope, facial asymmetry, speech difficulty, weakness, light-headedness, numbness and headaches.   Hematological: Negative.  Negative for adenopathy. Does not bruise/bleed easily.   Psychiatric/Behavioral: Negative.  Negative for agitation, behavioral problems, confusion, decreased concentration, dysphoric mood, hallucinations, self-injury, sleep disturbance and suicidal ideas. The patient is not nervous/anxious and is not hyperactive.    All other systems reviewed and are negative.    Physical Exam     Initial Vitals [07/19/23 2142]   BP Pulse Resp Temp SpO2   (!) 205/113 94 20 98 °F (36.7 °C) (!) 94 %      MAP       --         Physical Exam    Nursing note and vitals reviewed.  Constitutional: She appears well-developed and well-nourished. She is not diaphoretic. No distress.   HENT:   Head: Normocephalic and atraumatic.   Mouth/Throat: Oropharynx is clear and moist. No oropharyngeal exudate.   Eyes: Conjunctivae and EOM are normal. Pupils are equal, round, and reactive to light. No scleral icterus.   Neck: Neck supple. No JVD present.   Normal range of motion.  Cardiovascular:  Normal  rate, regular rhythm, normal heart sounds and intact distal pulses.     Exam reveals no gallop and no friction rub.       No murmur heard.  Pulmonary/Chest: Breath sounds normal. No respiratory distress. She has no wheezes. She exhibits no tenderness.   Abdominal: Abdomen is soft. Bowel sounds are normal. She exhibits no distension. There is no abdominal tenderness. There is no rebound.   Musculoskeletal:         General: Normal range of motion.      Cervical back: Normal range of motion and neck supple.     Neurological: She is alert and oriented to person, place, and time. She has normal strength.   Skin: Skin is warm and dry. Capillary refill takes less than 2 seconds.   Psychiatric: She has a normal mood and affect. Her behavior is normal. Judgment and thought content normal.       ED Course   Procedures  Admission on 07/19/2023   Component Date Value Ref Range Status    Sodium Level 07/19/2023 143  132 - 146 mmol/L Final    Potassium Level 07/19/2023 4.0  3.5 - 5.1 mmol/L Final    Chloride 07/19/2023 106  98 - 111 mmol/L Final    Carbon Dioxide 07/19/2023 26  23 - 31 mmol/L Final    Glucose Level 07/19/2023 172 (H)  75 - 121 mg/dL Final    Blood Urea Nitrogen 07/19/2023 15.0  9.8 - 20.1 mg/dL Final    Creatinine 07/19/2023 0.84  0.55 - 1.02 mg/dL Final    Calcium Level Total 07/19/2023 9.5  8.4 - 10.2 mg/dL Final    Protein Total 07/19/2023 7.7 (H)  5.8 - 7.6 gm/dL Final    Albumin Level 07/19/2023 3.9  3.4 - 4.8 g/dL Final    Globulin 07/19/2023 3.8 (H)  2.4 - 3.5 gm/dL Final    Albumin/Globulin Ratio 07/19/2023 1.0 (L)  1.1 - 2.0 ratio Final    Bilirubin Total 07/19/2023 0.7  <=1.5 mg/dL Final    Alkaline Phosphatase 07/19/2023 60  40 - 150 unit/L Final    Alanine Aminotransferase 07/19/2023 10  0 - 55 unit/L Final    Aspartate Aminotransferase 07/19/2023 14  5 - 34 unit/L Final    eGFR 07/19/2023 >60  mls/min/1.73/m2 Final    Color,  07/19/2023 Dark Yellow  Yellow, Light-Yellow, Dark Yellow, Kalyn, Straw  Final    Appearance, UA 07/19/2023 Cloudy (A)  Clear Final    Specific Gravity, UA 07/19/2023 >=1.030   Final    pH, UA 07/19/2023 5.5  5.0 - 8.5 Final    Protein, UA 07/19/2023 3+ (A)  Negative Final    Glucose, UA 07/19/2023 Negative  Negative, Normal Final    Ketones, UA 07/19/2023 Negative  Negative Final    Blood, UA 07/19/2023 1+ (A)  Negative Final    Bilirubin, UA 07/19/2023 Negative  Negative Final    Urobilinogen, UA 07/19/2023 0.2  0.2, 1.0, Normal Final    Nitrites, UA 07/19/2023 Positive (A)  Negative Final    Leukocyte Esterase, UA 07/19/2023 Negative  Negative Final    Lactic Acid Level 07/19/2023 1.7  0.5 - 2.2 mmol/L Final    Troponin-I 07/19/2023 <0.010  0.000 - 0.045 ng/mL Final    Natriuretic Peptide 07/19/2023 328.4 (H)  <=100.0 pg/mL Final    Magnesium Level 07/19/2023 1.78  1.60 - 2.60 mg/dL Final    WBC 07/19/2023 12.57 (H)  4.50 - 11.50 x10(3)/mcL Final    RBC 07/19/2023 5.43 (H)  4.20 - 5.40 x10(6)/mcL Final    Hgb 07/19/2023 13.6  12.0 - 16.0 g/dL Final    Hct 07/19/2023 44.3  37.0 - 47.0 % Final    MCV 07/19/2023 81.6  80.0 - 94.0 fL Final    MCH 07/19/2023 25.0 (L)  27.0 - 31.0 pg Final    MCHC 07/19/2023 30.7 (L)  33.0 - 36.0 g/dL Final    RDW 07/19/2023 14.6  11.5 - 17.0 % Final    Platelet 07/19/2023 188  130 - 400 x10(3)/mcL Final    MPV 07/19/2023 11.5 (H)  7.4 - 10.4 fL Final    Neut % 07/19/2023 84.8  % Final    Lymph % 07/19/2023 8.7  % Final    Mono % 07/19/2023 4.9  % Final    Eos % 07/19/2023 0.4  % Final    Basophil % 07/19/2023 0.5  % Final    Lymph # 07/19/2023 1.09  0.6 - 4.6 x10(3)/mcL Final    Neut # 07/19/2023 10.67 (H)  2.1 - 9.2 x10(3)/mcL Final    Mono # 07/19/2023 0.61  0.1 - 1.3 x10(3)/mcL Final    Eos # 07/19/2023 0.05  0 - 0.9 x10(3)/mcL Final    Baso # 07/19/2023 0.06  <=0.2 x10(3)/mcL Final    IG# 07/19/2023 0.09 (H)  0 - 0.04 x10(3)/mcL Final    IG% 07/19/2023 0.7  % Final    Amylase Level 07/19/2023 86  20 - 160 unit/L Final    Lipase Level 07/19/2023 34   <=60 U/L Final    Bacteria, UA 07/19/2023 Many (A)  None Seen, Rare, Occasional /HPF Final    Mucous, UA 07/19/2023 Moderate (A)  None Seen /LPF Final    Amporphous Urate Crystals, UA 07/19/2023 Rare (A)  None Seen /HPF Final    Uric Acid Crystals, UA 07/19/2023 Moderate (A)  None Seen /HPF Final    RBC, UA 07/19/2023 6-10 (A)  None Seen, 0-2, 3-5, 0-5 /HPF Final    WBC, UA 07/19/2023 11-20 (A)  None Seen, 0-2, 3-5, 0-5 /HPF Final    Squamous Epithelial Cells, UA 07/19/2023 Moderate (A)  None Seen, Rare, Occasional, Occ /HPF Final       Labs Reviewed   COMPREHENSIVE METABOLIC PANEL - Abnormal; Notable for the following components:       Result Value    Glucose Level 172 (*)     Protein Total 7.7 (*)     Globulin 3.8 (*)     Albumin/Globulin Ratio 1.0 (*)     All other components within normal limits   URINALYSIS, REFLEX TO URINE CULTURE - Abnormal; Notable for the following components:    Appearance, UA Cloudy (*)     Protein, UA 3+ (*)     Blood, UA 1+ (*)     Nitrites, UA Positive (*)     All other components within normal limits   B-TYPE NATRIURETIC PEPTIDE - Abnormal; Notable for the following components:    Natriuretic Peptide 328.4 (*)     All other components within normal limits   CBC WITH DIFFERENTIAL - Abnormal; Notable for the following components:    WBC 12.57 (*)     RBC 5.43 (*)     MCH 25.0 (*)     MCHC 30.7 (*)     MPV 11.5 (*)     Neut # 10.67 (*)     IG# 0.09 (*)     All other components within normal limits   URINALYSIS, MICROSCOPIC - Abnormal; Notable for the following components:    Bacteria, UA Many (*)     Mucous, UA Moderate (*)     Amporphous Urate Crystals, UA Rare (*)     Uric Acid Crystals, UA Moderate (*)     RBC, UA 6-10 (*)     WBC, UA 11-20 (*)     Squamous Epithelial Cells, UA Moderate (*)     All other components within normal limits   LACTIC ACID, PLASMA - Normal   TROPONIN I - Normal   MAGNESIUM - Normal   AMYLASE - Normal   LIPASE - Normal   CULTURE, URINE   CBC W/ AUTO  DIFFERENTIAL    Narrative:     The following orders were created for panel order CBC auto differential.  Procedure                               Abnormality         Status                     ---------                               -----------         ------                     CBC with Differential[[id number]]        Abnormal            Final result                 Please view results for these tests on the individual orders.          Imaging Results              X-Ray Abdomen Flat And Erect (Preliminary result)  Result time 07/19/23 23:42:00      Wet Read by Medardo Gonsales MD (07/19/23 23:42:00, [facility] - Emergency Dept, Emergency Medicine)    Nonspecific bowel gas pattern with no evidence of obstruction or perforation                                     Medications   levoFLOXacin 500 mg/100 mL IVPB 500 mg (500 mg Intravenous New Bag 7/19/23 2348)   cloNIDine tablet 0.2 mg (0.2 mg Oral Given 7/19/23 2150)   0.9%  NaCl infusion (1,000 mLs Intravenous New Bag 7/19/23 2202)   ondansetron injection 4 mg (4 mg Intravenous Given 7/19/23 2221)   hydrALAZINE injection 10 mg (10 mg Intravenous Given 7/19/23 2221)     Medical Decision Making:   Differential Diagnosis:   Viral gastroenteritis, pancreatitis, gastritis, diverticulitis, urinary tract infection  Clinical Tests:   Lab Tests: Ordered and Reviewed  Radiological Study: Ordered and Reviewed  ED Management:  We attempted to give patient 0.2 of clonidine for blood pressure when she vomited it right away so that point an IV was started she was given some IV Zofran and hydralazine for her blood pressure.  After fluids blood pressure management and some Zofran she feels markedly improved and I reviewed the labs and x-rays with her and her family members showing that she has a urinary tract infection.  She does have a penicillin and sulfa allergy which she does not know what reaction she has so will give Levaquin intravenously here in the emergency  department because of her symptoms and elevation in her white blood cell count and will send Macrobid for 5 days to the [place] pharmacy where she lives                        Clinical Impression:   Final diagnoses:  [R11.10] Vomiting  [R53.1] Generalized weakness  [N39.0, R31.9] Urinary tract infection with hematuria, site unspecified (Primary)        ED Disposition Condition    Discharge Stable          ED Prescriptions       Medication Sig Dispense Start Date End Date Auth. Provider    nitrofurantoin, macrocrystal-monohydrate, (MACROBID) 100 MG capsule Take 1 capsule (100 mg total) by mouth 2 (two) times daily. for 5 days 10 capsule 7/20/2023 7/25/2023 Medardo Gonsales MD          Follow-up Information       Follow up With Specialties Details Why Contact Info    Ryan Bojorquez MD Family Medicine In 1 week  [address] [facility] [address]  [phone]            Eleanor Russell is a certified MA and was present during the entire interaction with this patient      Medardo Gonsales MD  07/20/23 0019

## 2023-07-23 LAB — BACTERIA UR CULT: ABNORMAL

## 2023-12-16 ENCOUNTER — HOSPITAL ENCOUNTER (EMERGENCY)
Facility: HOSPITAL | Age: 88
Discharge: HOME | End: 2023-12-16
Attending: GENERAL ACUTE CARE HOSPITAL
Payer: MEDICARE

## 2023-12-16 VITALS
WEIGHT: 165 LBS | HEIGHT: 66 IN | DIASTOLIC BLOOD PRESSURE: 84 MMHG | TEMPERATURE: 98 F | HEART RATE: 72 BPM | SYSTOLIC BLOOD PRESSURE: 163 MMHG | RESPIRATION RATE: 18 BRPM | OXYGEN SATURATION: 96 % | BODY MASS INDEX: 26.52 KG/M2

## 2023-12-16 DIAGNOSIS — N94.9 ADNEXAL CYST: ICD-10-CM

## 2023-12-16 DIAGNOSIS — N39.0 URINARY TRACT INFECTION WITHOUT HEMATURIA, SITE UNSPECIFIED: Primary | ICD-10-CM

## 2023-12-16 DIAGNOSIS — R42 DIZZINESS: ICD-10-CM

## 2023-12-16 LAB
ALBUMIN SERPL-MCNC: 4 G/DL (ref 3.4–4.8)
ALBUMIN/GLOB SERPL: 1.2 RATIO (ref 1.1–2)
ALP SERPL-CCNC: 66 UNIT/L (ref 40–150)
ALT SERPL-CCNC: 14 UNIT/L (ref 0–55)
APPEARANCE UR: CLEAR
AST SERPL-CCNC: 18 UNIT/L (ref 5–34)
BACTERIA #/AREA URNS AUTO: ABNORMAL /HPF
BASOPHILS # BLD AUTO: 0.08 X10(3)/MCL
BASOPHILS NFR BLD AUTO: 0.5 %
BILIRUB SERPL-MCNC: 0.6 MG/DL
BILIRUB UR QL STRIP.AUTO: NEGATIVE
BUN SERPL-MCNC: 18 MG/DL (ref 9.8–20.1)
CALCIUM SERPL-MCNC: 9.8 MG/DL (ref 8.4–10.2)
CHLORIDE SERPL-SCNC: 106 MMOL/L (ref 98–111)
CO2 SERPL-SCNC: 24 MMOL/L (ref 23–31)
COLOR UR AUTO: YELLOW
CREAT SERPL-MCNC: 0.98 MG/DL (ref 0.55–1.02)
EOSINOPHIL # BLD AUTO: 0.18 X10(3)/MCL (ref 0–0.9)
EOSINOPHIL NFR BLD AUTO: 1.1 %
ERYTHROCYTE [DISTWIDTH] IN BLOOD BY AUTOMATED COUNT: 14.4 % (ref 11.5–17)
GFR SERPLBLD CREATININE-BSD FMLA CKD-EPI: 55 MLS/MIN/1.73/M2
GLOBULIN SER-MCNC: 3.4 GM/DL (ref 2.4–3.5)
GLUCOSE SERPL-MCNC: 171 MG/DL (ref 75–121)
GLUCOSE UR QL STRIP.AUTO: NEGATIVE
HCT VFR BLD AUTO: 44.9 % (ref 37–47)
HGB BLD-MCNC: 14.2 G/DL (ref 12–16)
IMM GRANULOCYTES # BLD AUTO: 0.11 X10(3)/MCL (ref 0–0.04)
IMM GRANULOCYTES NFR BLD AUTO: 0.7 %
KETONES UR QL STRIP.AUTO: NEGATIVE
LEUKOCYTE ESTERASE UR QL STRIP.AUTO: NEGATIVE
LIPASE SERPL-CCNC: 45 U/L
LYMPHOCYTES # BLD AUTO: 1.6 X10(3)/MCL (ref 0.6–4.6)
LYMPHOCYTES NFR BLD AUTO: 9.9 %
MCH RBC QN AUTO: 26.3 PG (ref 27–31)
MCHC RBC AUTO-ENTMCNC: 31.6 G/DL (ref 33–36)
MCV RBC AUTO: 83.1 FL (ref 80–94)
MONOCYTES # BLD AUTO: 0.71 X10(3)/MCL (ref 0.1–1.3)
MONOCYTES NFR BLD AUTO: 4.4 %
NEUTROPHILS # BLD AUTO: 13.52 X10(3)/MCL (ref 2.1–9.2)
NEUTROPHILS NFR BLD AUTO: 83.4 %
NITRITE UR QL STRIP.AUTO: POSITIVE
PH UR STRIP.AUTO: 5 [PH]
PLATELET # BLD AUTO: 210 X10(3)/MCL (ref 130–400)
PMV BLD AUTO: 10.6 FL (ref 7.4–10.4)
POTASSIUM SERPL-SCNC: 4.3 MMOL/L (ref 3.5–5.1)
PROT SERPL-MCNC: 7.4 GM/DL (ref 5.8–7.6)
PROT UR QL STRIP.AUTO: ABNORMAL
RBC # BLD AUTO: 5.4 X10(6)/MCL (ref 4.2–5.4)
RBC #/AREA URNS AUTO: ABNORMAL /HPF
RBC UR QL AUTO: ABNORMAL
SODIUM SERPL-SCNC: 141 MMOL/L (ref 132–146)
SP GR UR STRIP.AUTO: >=1.03 (ref 1–1.03)
SQUAMOUS #/AREA URNS AUTO: ABNORMAL /HPF
TROPONIN I SERPL-MCNC: <0.01 NG/ML (ref 0–0.04)
UROBILINOGEN UR STRIP-ACNC: 0.2
WBC # SPEC AUTO: 16.2 X10(3)/MCL (ref 4.5–11.5)
WBC #/AREA URNS AUTO: ABNORMAL /HPF

## 2023-12-16 PROCEDURE — 84484 ASSAY OF TROPONIN QUANT: CPT | Performed by: NURSE PRACTITIONER

## 2023-12-16 PROCEDURE — 85025 COMPLETE CBC W/AUTO DIFF WBC: CPT | Performed by: NURSE PRACTITIONER

## 2023-12-16 PROCEDURE — 96375 TX/PRO/DX INJ NEW DRUG ADDON: CPT

## 2023-12-16 PROCEDURE — 93005 ELECTROCARDIOGRAM TRACING: CPT

## 2023-12-16 PROCEDURE — 99285 EMERGENCY DEPT VISIT HI MDM: CPT | Mod: 25

## 2023-12-16 PROCEDURE — 63600175 PHARM REV CODE 636 W HCPCS: Performed by: NURSE PRACTITIONER

## 2023-12-16 PROCEDURE — 83690 ASSAY OF LIPASE: CPT | Performed by: NURSE PRACTITIONER

## 2023-12-16 PROCEDURE — 93010 EKG 12-LEAD: ICD-10-PCS | Mod: ,,, | Performed by: INTERNAL MEDICINE

## 2023-12-16 PROCEDURE — 96365 THER/PROPH/DIAG IV INF INIT: CPT

## 2023-12-16 PROCEDURE — 80053 COMPREHEN METABOLIC PANEL: CPT | Performed by: NURSE PRACTITIONER

## 2023-12-16 PROCEDURE — 96361 HYDRATE IV INFUSION ADD-ON: CPT

## 2023-12-16 PROCEDURE — 25500020 PHARM REV CODE 255: Performed by: NURSE PRACTITIONER

## 2023-12-16 PROCEDURE — 81001 URINALYSIS AUTO W/SCOPE: CPT | Performed by: NURSE PRACTITIONER

## 2023-12-16 PROCEDURE — 25000003 PHARM REV CODE 250: Performed by: NURSE PRACTITIONER

## 2023-12-16 PROCEDURE — 93010 ELECTROCARDIOGRAM REPORT: CPT | Mod: ,,, | Performed by: INTERNAL MEDICINE

## 2023-12-16 RX ORDER — MECLIZINE HCL 12.5 MG 12.5 MG/1
25 TABLET ORAL
Status: COMPLETED | OUTPATIENT
Start: 2023-12-16 | End: 2023-12-16

## 2023-12-16 RX ORDER — NITROFURANTOIN 25; 75 MG/1; MG/1
100 CAPSULE ORAL 2 TIMES DAILY
Qty: 10 CAPSULE | Refills: 0 | Status: SHIPPED | OUTPATIENT
Start: 2023-12-16 | End: 2023-12-21

## 2023-12-16 RX ORDER — ONDANSETRON 2 MG/ML
4 INJECTION INTRAMUSCULAR; INTRAVENOUS
Status: COMPLETED | OUTPATIENT
Start: 2023-12-16 | End: 2023-12-16

## 2023-12-16 RX ADMIN — CEFTRIAXONE SODIUM 1 G: 1 INJECTION, POWDER, FOR SOLUTION INTRAMUSCULAR; INTRAVENOUS at 10:12

## 2023-12-16 RX ADMIN — ONDANSETRON 4 MG: 2 INJECTION INTRAMUSCULAR; INTRAVENOUS at 08:12

## 2023-12-16 RX ADMIN — MECLIZINE HCL 12.5 MG 25 MG: 12.5 TABLET ORAL at 08:12

## 2023-12-16 RX ADMIN — IOPAMIDOL 100 ML: 755 INJECTION, SOLUTION INTRAVENOUS at 09:12

## 2023-12-16 RX ADMIN — SODIUM CHLORIDE 1000 ML: 9 INJECTION, SOLUTION INTRAVENOUS at 08:12

## 2023-12-17 NOTE — ED PROVIDER NOTES
Encounter Date: 12/16/2023       History     Chief Complaint   Patient presents with    Vomiting     Pt reports emesis starting around 1500 and has been feeling dizzy. Pt reports last time this happened was when she had a UTI. Denies painful urination. Pt reports mid abd pain. Denies diarrhea.      Patient is a 89-year-old who presents emerged department with complaints of dizziness with vomiting since 3:00 a.m. this afternoon.  Patient has history of vertigo but states she also get these symptoms when she has a UTIs.  She also does report some abdominal pain that is mild in nature.  She denies any worsening alleviating factors the abdominal pain.  She denies any fevers chills.  She states this morning she was felt fine as well as yesterday.  She denies any respiratory symptoms except for 2 weeks ago.  She denies any diarrhea with this vomiting.  She has no other complaints or associated symptoms at this time.      Review of patient's allergies indicates:   Allergen Reactions    Amiodarone Other (See Comments)    Levofloxacin Other (See Comments)    Red dye     Statins-hmg-coa reductase inhibitors Other (See Comments)    Strawberry     Sulfa (sulfonamide antibiotics)     Hydromorphone Nausea And Vomiting    Morphine Nausea And Vomiting and Rash    Penicillins Rash     Past Medical History:   Diagnosis Date    Cancer     Hypertension      History reviewed. No pertinent surgical history.  History reviewed. No pertinent family history.  Social History     Tobacco Use    Smoking status: Never    Smokeless tobacco: Never   Substance Use Topics    Alcohol use: Never    Drug use: Never     Review of Systems   Constitutional:  Positive for fatigue. Negative for activity change, appetite change and fever.   HENT:  Negative for congestion, dental problem and sore throat.    Respiratory:  Negative for shortness of breath.    Cardiovascular:  Negative for chest pain.   Gastrointestinal:  Positive for abdominal pain, nausea and  vomiting. Negative for abdominal distention.   Genitourinary:  Negative for dysuria, vaginal bleeding, vaginal discharge and vaginal pain.   Musculoskeletal:  Negative for back pain.   Skin:  Negative for rash.   Neurological:  Positive for dizziness. Negative for weakness.   Hematological:  Does not bruise/bleed easily.   Psychiatric/Behavioral:  Negative for agitation.    All other systems reviewed and are negative.      Physical Exam     Initial Vitals [12/16/23 1950]   BP Pulse Resp Temp SpO2   (!) 169/85 73 18 97.8 °F (36.6 °C) 95 %      MAP       --         Physical Exam    Nursing note and vitals reviewed.  Constitutional: Vital signs are normal. She appears well-developed and well-nourished.  Non-toxic appearance. She does not have a sickly appearance.   HENT:   Head: Normocephalic and atraumatic.   Right Ear: External ear normal.   Left Ear: External ear normal.   Eyes: Conjunctivae, EOM and lids are normal. Lids are everted and swept, no foreign bodies found.   Neck: Trachea normal and phonation normal. Neck supple. No thyroid mass and no thyromegaly present.   Normal range of motion.   Full passive range of motion without pain.     Cardiovascular:  Normal rate, regular rhythm, S1 normal, S2 normal, normal heart sounds, intact distal pulses and normal pulses.           Pulmonary/Chest: Breath sounds normal. No respiratory distress.   Abdominal: Abdomen is soft. There is abdominal tenderness.   Mid periumbilical abdominal pain.   Musculoskeletal:      Cervical back: Full passive range of motion without pain, normal range of motion and neck supple.     Lymphadenopathy:     She has no cervical adenopathy.   Neurological: She is alert and oriented to person, place, and time. She has normal strength. GCS score is 15. GCS eye subscore is 4. GCS verbal subscore is 5. GCS motor subscore is 6.   Skin: Skin is warm, dry and intact. Capillary refill takes less than 2 seconds.   Psychiatric: She has a normal mood and  affect. Her speech is normal and behavior is normal. Judgment normal. Cognition and memory are normal.         ED Course   Procedures  Labs Reviewed   COMPREHENSIVE METABOLIC PANEL - Abnormal; Notable for the following components:       Result Value    Glucose Level 171 (*)     All other components within normal limits   URINALYSIS, REFLEX TO URINE CULTURE - Abnormal; Notable for the following components:    Protein, UA 2+ (*)     Blood, UA Trace-Intact (*)     Nitrites, UA Positive (*)     All other components within normal limits   CBC WITH DIFFERENTIAL - Abnormal; Notable for the following components:    WBC 16.20 (*)     MCH 26.3 (*)     MCHC 31.6 (*)     MPV 10.6 (*)     Neut # 13.52 (*)     IG# 0.11 (*)     All other components within normal limits   URINALYSIS, MICROSCOPIC - Abnormal; Notable for the following components:    Bacteria, UA Moderate (*)     All other components within normal limits   LIPASE - Normal   TROPONIN I - Normal   CBC W/ AUTO DIFFERENTIAL    Narrative:     The following orders were created for panel order CBC auto differential.  Procedure                               Abnormality         Status                     ---------                               -----------         ------                     CBC with Differential[[id number]]       Abnormal            Final result                 Please view results for these tests on the individual orders.          Imaging Results              CT Abdomen Pelvis With IV Contrast NO Oral Contrast (Preliminary result)  Result time 12/16/23 22:31:51      Preliminary result by Juan España MD (12/16/23 22:31:51)                   Narrative:    START OF REPORT:  Technique: CT of the abdomen and pelvis was performed with axial images as well as sagittal and coronal reconstruction images with intravenous contrast.    Comparison: None available.    Clinical History: Abd pain.    Dosage Information: Automated Exposure Control was utilized 313.46  mGy.cm.    Findings:  Lines and Tubes: None.  Thorax:  Lungs: There is moderate nonspecific dependent change at the left lung base. Moderate streaky and linear opacity is present at the visualized lung bases and right middle lobe, consistent with scarring and atelectasis. No focal infiltrate or consolidation is seen.  Pleura: No effusions or thickening.  Heart: The heart size is within normal limits.  Abdomen:  Abdominal Wall: No abdominal wall pathology is seen.  Liver: There are multiple variably sized cysts diffusely scattered in both lobes of the liver with the largest noted in the right lobe as seen on series 2 image 63 and measures 2.4 cm. The liver otherwise appears unremarkable.  Biliary System: No intrahepatic or extrahepatic biliary duct dilatation is seen.  Gallbladder: Surgical clips are seen in the gallbladder fossa which may reflect prior cholecystectomy correlate with surgical history.  Pancreas: The pancreas appears unremarkable.  Adrenals: The adrenal glands appear unremarkable.  Kidneys: Multiple cysts are identified in the left kidney the largest of which measures 2.3 cm is on Image 47, Series 2 in the mid pole of the left kidney. The left kidney otherwise appears unremarkable with no stones, masses or hydronephrosis identified. Multiple cysts are identified in the right kidney the largest of which measures 1.5 cm is on Image 57, Series 2 in the mid pole of the right kidney. The right kidney otherwise appears unremarkable with no stones, masses or hydronephrosis identified.  Aorta: There is moderate calcification of the abdominal aorta and its branches.  IVC: Unremarkable.  Bowel:  Esophagus: The visualized esophagus appears unremarkable.  Stomach: The stomach appears unremarkable.  Duodenum: Unremarkable appearing duodenum.  Small Bowel: The small bowel appears unremarkable.  Colon: Nondistended. Extensive scattered diverticula are seen throughout the colon. No associated inflammatory stranding  or pericolonic fluid is seen to suggest diverticulitis.  Appendix: The appendix is not identified but no inflammatory changes are seen in the right lower quadrant to suggest appendicitis.  Peritoneum: No intraperitoneal free air or ascites is seen.    Pelvis:  Bladder: The bladder is nondistended and cannot be definitively evaluated.  Female: There is a thin walled fluid collection in the left adnexal region centered on series 2 image 103 and measures 8.1 x 4.2 cm. There is no surrounding fat stranding or avid wall enhancement noted. This may be a simple cyst however the possibility of a cystic neoplasm is not excluded.  Uterus: The uterus is not identified.  Ovaries: The ovaries are not identified.    Bony structures:  Dorsal Spine: There is moderate to severe spondylosis of the visualized dorsal spine. There is grade I degenerative anterior listhesis of L4 on L5.  Bony Pelvis: There is mild degenerative change of the bilateral hips.      Impression:  1. There is a thin walled fluid collection in the left adnexal region centered on series 2 image 103 and measures 8.1 x 4.2 cm. There is no surrounding fat stranding or avid wall enhancement noted. This may be a simple cyst however the possibility of a cystic neoplasm is not excluded. Correlate clinically as regards additional evaluation and follow up.  2. No acute intraabdominal or pelvic solid organ or bowel pathology identified. Details and other findings as discussed above.                                         Medications   cefTRIAXone (ROCEPHIN) 1 g in dextrose 5 % in water (D5W) 100 mL IVPB (MB+) (1 g Intravenous New Bag 12/16/23 2239)   sodium chloride 0.9% bolus 1,000 mL 1,000 mL (0 mLs Intravenous Stopped 12/16/23 2115)   ondansetron injection 4 mg (4 mg Intravenous Given 12/16/23 2012)   meclizine tablet 25 mg (25 mg Oral Given 12/16/23 2037)   iopamidoL (ISOVUE-370) injection 100 mL (100 mLs Intravenous Given 12/16/23 2150)     Medical Decision  Making  Patient is a 89-year-old who presents emerged department with complaints of dizziness with vomiting since 3:00 a.m. this afternoon.  Patient has history of vertigo but states she also get these symptoms when she has a UTIs.  She also does report some abdominal pain that is mild in nature.  She denies any worsening alleviating factors the abdominal pain.  She denies any fevers chills.  She states this morning she was felt fine as well as yesterday.  She denies any respiratory symptoms except for 2 weeks ago.  She denies any diarrhea with this vomiting.  She has no other complaints or associated symptoms at this time.        Problems Addressed:  Adnexal cyst: acute illness or injury     Details: Incidental finding for instructed follow up with PCP for continued monitoring.  Dizziness: acute illness or injury     Details: Patient has dizziness in the has with UTI however she does have vertigo so meclizine was given here and was successful in resolving dizziness and nausea.  Instruct patient continue her meclizine at home p.r.n. for symptoms.  Patient feels better after IV hydration will be discharged home.  Urinary tract infection without hematuria, site unspecified: acute illness or injury     Details: UTI here on evaluation.  IV antibiotics given here and oral sent home to continued use for infection.  Strict ED return precautions discussed for changing worsening symptoms.    Amount and/or Complexity of Data Reviewed  Labs: ordered. Decision-making details documented in ED Course.  Radiology: ordered. Decision-making details documented in ED Course.    Risk  Prescription drug management.               ED Course as of 12/16/23 2242   Sat Dec 16, 2023   2240 Patient notified about the incidental finding of a cyst in the adnexa region.  She states this has been seen before by Dr. Yepez but he discharge her instead it was nothing to worry about.  Encouraged her to follow-up with PCP for continued evaluation  monitoring. [SL]      ED Course User Index  [SL] Jian Busch FNP                           Clinical Impression:  Final diagnoses:  [R42] Dizziness  [N39.0] Urinary tract infection without hematuria, site unspecified (Primary)  [N94.9] Adnexal cyst          ED Disposition Condition    Discharge Stable          ED Prescriptions       Medication Sig Dispense Start Date End Date Auth. Provider    nitrofurantoin, macrocrystal-monohydrate, (MACROBID) 100 MG capsule Take 1 capsule (100 mg total) by mouth 2 (two) times daily. for 5 days 10 capsule 12/16/2023 12/21/2023 Jian Busch FNP          Follow-up Information       Follow up With Specialties Details Why Contact Info    Ryan Bojorquez MD Family Medicine Schedule an appointment as soon as possible for a visit in 1 week As needed, For wound re-check [address]  [place] [address]  [phone]               Jian Busch FNP  12/16/23 0785

## 2024-05-20 ENCOUNTER — HOSPITAL ENCOUNTER (EMERGENCY)
Facility: HOSPITAL | Age: 89
Discharge: HOME | End: 2024-05-20
Attending: INTERNAL MEDICINE
Payer: MEDICARE

## 2024-05-20 VITALS
HEIGHT: 66 IN | SYSTOLIC BLOOD PRESSURE: 178 MMHG | BODY MASS INDEX: 24.11 KG/M2 | WEIGHT: 150 LBS | DIASTOLIC BLOOD PRESSURE: 112 MMHG | RESPIRATION RATE: 17 BRPM | TEMPERATURE: 99 F | OXYGEN SATURATION: 96 % | HEART RATE: 82 BPM

## 2024-05-20 DIAGNOSIS — B02.8 HERPES ZOSTER WITH COMPLICATION: Primary | ICD-10-CM

## 2024-05-20 LAB
BACTERIA #/AREA URNS AUTO: ABNORMAL /HPF
BILIRUB UR QL STRIP.AUTO: NEGATIVE
CLARITY UR: CLEAR
COLOR UR AUTO: YELLOW
GLUCOSE UR QL STRIP: NEGATIVE
HGB UR QL STRIP: ABNORMAL
KETONES UR QL STRIP: NEGATIVE
LEUKOCYTE ESTERASE UR QL STRIP: NEGATIVE
MUCOUS THREADS URNS QL MICRO: ABNORMAL /LPF
NITRITE UR QL STRIP: NEGATIVE
PH UR STRIP: 7 [PH]
PROT UR QL STRIP: ABNORMAL
RBC #/AREA URNS AUTO: ABNORMAL /HPF
SP GR UR STRIP.AUTO: 1.01 (ref 1–1.03)
SQUAMOUS #/AREA URNS AUTO: ABNORMAL /HPF
UROBILINOGEN UR STRIP-ACNC: 0.2
WBC #/AREA URNS AUTO: ABNORMAL /HPF

## 2024-05-20 PROCEDURE — 99283 EMERGENCY DEPT VISIT LOW MDM: CPT

## 2024-05-20 PROCEDURE — 81003 URINALYSIS AUTO W/O SCOPE: CPT | Performed by: INTERNAL MEDICINE

## 2024-05-20 RX ORDER — HYDROCODONE BITARTRATE AND ACETAMINOPHEN 5; 325 MG/1; MG/1
1 TABLET ORAL EVERY 6 HOURS PRN
Qty: 12 TABLET | Refills: 0 | Status: SHIPPED | OUTPATIENT
Start: 2024-05-20 | End: 2024-05-23

## 2024-05-20 NOTE — ED PROVIDER NOTES
Encounter Date: 5/20/2024  History from patient     History     Chief Complaint   Patient presents with    shingles pain     Diagnosed with shingles 1 week ago and almost done with meds and pain in not getting better     HPI    Eliane Burgos is 89 y.o. female who  has a past medical history of Cancer and Hypertension. arrives in ER with c/o shingles pain (Diagnosed with shingles 1 week ago and almost done with meds and pain in not getting better)      Review of patient's allergies indicates:   Allergen Reactions    Amiodarone Other (See Comments)    Levofloxacin Other (See Comments)    Red dye     Statins-hmg-coa reductase inhibitors Other (See Comments)    Strawberry     Sulfa (sulfonamide antibiotics)     Hydromorphone Nausea And Vomiting    Morphine Nausea And Vomiting and Rash    Penicillins Rash     Past Medical History:   Diagnosis Date    Cancer     Hypertension      History reviewed. No pertinent surgical history.  No family history on file.  Social History     Tobacco Use    Smoking status: Never    Smokeless tobacco: Never   Substance Use Topics    Alcohol use: Never    Drug use: Never     Review of Systems   Constitutional:  Negative for fever.   HENT:  Negative for trouble swallowing and voice change.    Eyes:  Negative for visual disturbance.   Respiratory:  Negative for cough and shortness of breath.    Cardiovascular:  Negative for chest pain.   Gastrointestinal:  Negative for abdominal pain, diarrhea and vomiting.   Genitourinary:  Negative for dysuria and hematuria.   Musculoskeletal:  Negative for back pain and gait problem.   Skin:  Negative for color change and rash.        Right upper quadrant pain from shingles the rash is getting better though   Neurological:  Negative for headaches.   Psychiatric/Behavioral:  Negative for behavioral problems and sleep disturbance.    All other systems reviewed and are negative.      Physical Exam     Initial Vitals [05/20/24 0855]   BP Pulse Resp Temp  SpO2   (!) 171/81 82 17 98.7 °F (37.1 °C) 95 %      MAP       --         Physical Exam    Nursing note and vitals reviewed.  Constitutional: No distress.   HENT:   Head: Atraumatic.   Cardiovascular:  Normal rate and regular rhythm.           Pulmonary/Chest: Breath sounds normal. No respiratory distress.   Abdominal: Abdomen is soft. Bowel sounds are normal.   Musculoskeletal:         General: Normal range of motion.     Neurological: She is alert and oriented to person, place, and time.   Skin: Skin is warm and dry.   Patient has the rash in the right upper quadrant to the back, it is drying off, she has a few scale on it,   Psychiatric: She has a normal mood and affect.         ED Course   Procedures  Labs Reviewed   URINALYSIS, REFLEX TO URINE CULTURE - Abnormal; Notable for the following components:       Result Value    Protein, UA 2+ (*)     Blood, UA Trace-Intact (*)     All other components within normal limits   URINALYSIS, MICROSCOPIC - Abnormal; Notable for the following components:    Mucous, UA Small (*)     Squamous Epithelial Cells, UA Few (*)     All other components within normal limits          Imaging Results    None          Medications - No data to display  Medical Decision Making    Eliane Burgos is 89 y.o. female who  has a past medical history of Cancer and Hypertension. arrives in ER with c/o shingles pain (Diagnosed with shingles 1 week ago and almost done with meds and pain in not getting better)      Patient was diagnosed with shingles by her family doctor she was put on gabapentin, Norco, acyclovir, she has finished acyclovir and Norco, and she was also having urinary tract infection so she was put on ciprofloxacin, she comes to the emergency room with complaint of pain in the area where she has shingles, in his not getting better, and she is finished with the pain medication and the family doctor is reluctant to call in more pain medicine for her.  They did not go to the family  doctor they just went to the nurse practitioner because they will have to sit and wait for the family doctor to see them so they decided to come to the emergency room to get seen about this.                                      Clinical Impression:  Final diagnoses:  [B02.8] Herpes zoster with complication (Primary)          ED Disposition Condition    Discharge Stable          ED Prescriptions       Medication Sig Dispense Start Date End Date Auth. Provider    HYDROcodone-acetaminophen (NORCO) 5-325 mg per tablet Take 1 tablet by mouth every 6 (six) hours as needed for Pain. 12 tablet 5/20/2024 5/23/2024 Tammy Worrell MD          Follow-up Information       Follow up With Specialties Details Why Contact Info    Ryan Bojorquez MD Family Medicine In 2 days  [address] [place] [address]  [phone]               Tammy Worrell MD  05/20/24 0976

## 2025-03-03 ENCOUNTER — HOSPITAL ENCOUNTER (EMERGENCY)
Facility: HOSPITAL | Age: OVER 89
Discharge: HOME | End: 2025-03-03
Attending: INTERNAL MEDICINE
Payer: MEDICARE

## 2025-03-03 VITALS
HEART RATE: 105 BPM | TEMPERATURE: 101 F | DIASTOLIC BLOOD PRESSURE: 71 MMHG | SYSTOLIC BLOOD PRESSURE: 115 MMHG | OXYGEN SATURATION: 95 % | RESPIRATION RATE: 18 BRPM | BODY MASS INDEX: 22.5 KG/M2 | HEIGHT: 66 IN | WEIGHT: 140 LBS

## 2025-03-03 DIAGNOSIS — U07.1 COVID-19: Primary | ICD-10-CM

## 2025-03-03 DIAGNOSIS — N39.0 URINARY TRACT INFECTION WITHOUT HEMATURIA, SITE UNSPECIFIED: ICD-10-CM

## 2025-03-03 DIAGNOSIS — R05.9 COUGH: ICD-10-CM

## 2025-03-03 LAB
ALBUMIN SERPL-MCNC: 3.7 G/DL (ref 3.4–4.8)
ALBUMIN/GLOB SERPL: 1 RATIO (ref 1.1–2)
ALP SERPL-CCNC: 59 UNIT/L (ref 40–150)
ALT SERPL-CCNC: 11 UNIT/L (ref 0–55)
ANION GAP SERPL CALC-SCNC: 9 MEQ/L
AST SERPL-CCNC: 17 UNIT/L (ref 5–34)
BACTERIA #/AREA URNS AUTO: ABNORMAL /HPF
BASOPHILS # BLD AUTO: 0.04 X10(3)/MCL
BASOPHILS NFR BLD AUTO: 0.5 %
BILIRUB SERPL-MCNC: 0.7 MG/DL
BILIRUB UR QL STRIP.AUTO: NEGATIVE
BUN SERPL-MCNC: 14 MG/DL (ref 9.8–20.1)
CALCIUM SERPL-MCNC: 9.2 MG/DL (ref 8.4–10.2)
CHLORIDE SERPL-SCNC: 106 MMOL/L (ref 98–111)
CLARITY UR: CLEAR
CO2 SERPL-SCNC: 25 MMOL/L (ref 23–31)
COLOR UR AUTO: YELLOW
CREAT SERPL-MCNC: 0.77 MG/DL (ref 0.55–1.02)
CREAT/UREA NIT SERPL: 18
EOSINOPHIL # BLD AUTO: 0.12 X10(3)/MCL (ref 0–0.9)
EOSINOPHIL NFR BLD AUTO: 1.5 %
ERYTHROCYTE [DISTWIDTH] IN BLOOD BY AUTOMATED COUNT: 14 % (ref 11.5–17)
GFR SERPLBLD CREATININE-BSD FMLA CKD-EPI: >60 ML/MIN/1.73/M2
GLOBULIN SER-MCNC: 3.6 GM/DL (ref 2.4–3.5)
GLUCOSE SERPL-MCNC: 104 MG/DL (ref 75–121)
GLUCOSE UR QL STRIP: NEGATIVE
HCT VFR BLD AUTO: 38.5 % (ref 37–47)
HGB BLD-MCNC: 12.4 G/DL (ref 12–16)
HGB UR QL STRIP: ABNORMAL
IMM GRANULOCYTES # BLD AUTO: 0.07 X10(3)/MCL (ref 0–0.04)
IMM GRANULOCYTES NFR BLD AUTO: 0.9 %
KETONES UR QL STRIP: NEGATIVE
LEUKOCYTE ESTERASE UR QL STRIP: NEGATIVE
LYMPHOCYTES # BLD AUTO: 0.61 X10(3)/MCL (ref 0.6–4.6)
LYMPHOCYTES NFR BLD AUTO: 7.9 %
MCH RBC QN AUTO: 25.8 PG (ref 27–31)
MCHC RBC AUTO-ENTMCNC: 32.2 G/DL (ref 33–36)
MCV RBC AUTO: 80.2 FL (ref 80–94)
MONOCYTES # BLD AUTO: 1.02 X10(3)/MCL (ref 0.1–1.3)
MONOCYTES NFR BLD AUTO: 13.2 %
NEUTROPHILS # BLD AUTO: 5.89 X10(3)/MCL (ref 2.1–9.2)
NEUTROPHILS NFR BLD AUTO: 76 %
NITRITE UR QL STRIP: POSITIVE
NRBC BLD AUTO-RTO: 0 %
PH UR STRIP: 5.5 [PH]
PLATELET # BLD AUTO: 172 X10(3)/MCL (ref 130–400)
PMV BLD AUTO: 10.2 FL (ref 7.4–10.4)
POTASSIUM SERPL-SCNC: 4 MMOL/L (ref 3.5–5.1)
PROT SERPL-MCNC: 7.3 GM/DL (ref 5.8–7.6)
PROT UR QL STRIP: ABNORMAL
RBC # BLD AUTO: 4.8 X10(6)/MCL (ref 4.2–5.4)
RBC #/AREA URNS AUTO: ABNORMAL /HPF
SODIUM SERPL-SCNC: 140 MMOL/L (ref 136–145)
SP GR UR STRIP.AUTO: 1.02 (ref 1–1.03)
SQUAMOUS #/AREA URNS AUTO: ABNORMAL /HPF
UROBILINOGEN UR STRIP-ACNC: 0.2
WBC # BLD AUTO: 7.75 X10(3)/MCL (ref 4.5–11.5)
WBC #/AREA URNS AUTO: ABNORMAL /HPF

## 2025-03-03 PROCEDURE — 63600175 PHARM REV CODE 636 W HCPCS

## 2025-03-03 PROCEDURE — 85025 COMPLETE CBC W/AUTO DIFF WBC: CPT

## 2025-03-03 PROCEDURE — 99284 EMERGENCY DEPT VISIT MOD MDM: CPT | Mod: 25

## 2025-03-03 PROCEDURE — 80053 COMPREHEN METABOLIC PANEL: CPT

## 2025-03-03 PROCEDURE — 81003 URINALYSIS AUTO W/O SCOPE: CPT

## 2025-03-03 PROCEDURE — 96372 THER/PROPH/DIAG INJ SC/IM: CPT

## 2025-03-03 PROCEDURE — 25000003 PHARM REV CODE 250

## 2025-03-03 RX ORDER — NITROFURANTOIN 25; 75 MG/1; MG/1
100 CAPSULE ORAL 2 TIMES DAILY
Qty: 10 CAPSULE | Refills: 0 | Status: SHIPPED | OUTPATIENT
Start: 2025-03-03 | End: 2025-03-03

## 2025-03-03 RX ORDER — ACETAMINOPHEN 500 MG
500 TABLET ORAL
Status: COMPLETED | OUTPATIENT
Start: 2025-03-03 | End: 2025-03-03

## 2025-03-03 RX ORDER — NITROFURANTOIN 25; 75 MG/1; MG/1
100 CAPSULE ORAL 2 TIMES DAILY
Qty: 10 CAPSULE | Refills: 0 | Status: SHIPPED | OUTPATIENT
Start: 2025-03-03 | End: 2025-03-08

## 2025-03-03 RX ORDER — DEXAMETHASONE SODIUM PHOSPHATE 4 MG/ML
4 INJECTION, SOLUTION INTRA-ARTICULAR; INTRALESIONAL; INTRAMUSCULAR; INTRAVENOUS; SOFT TISSUE
Status: COMPLETED | OUTPATIENT
Start: 2025-03-03 | End: 2025-03-03

## 2025-03-03 RX ORDER — DEXAMETHASONE 4 MG/1
4 TABLET ORAL EVERY 12 HOURS
Qty: 10 TABLET | Refills: 0 | Status: SHIPPED | OUTPATIENT
Start: 2025-03-03 | End: 2025-03-08

## 2025-03-03 RX ORDER — DEXAMETHASONE 4 MG/1
4 TABLET ORAL EVERY 12 HOURS
Qty: 10 TABLET | Refills: 0 | Status: SHIPPED | OUTPATIENT
Start: 2025-03-03 | End: 2025-03-03

## 2025-03-03 RX ADMIN — DEXAMETHASONE SODIUM PHOSPHATE 4 MG: 4 INJECTION, SOLUTION INTRA-ARTICULAR; INTRALESIONAL; INTRAMUSCULAR; INTRAVENOUS; SOFT TISSUE at 12:03

## 2025-03-03 RX ADMIN — ACETAMINOPHEN 500 MG: 500 TABLET, FILM COATED ORAL at 01:03

## 2025-03-03 NOTE — DISCHARGE INSTRUCTIONS
Steroid as prescribed. Antibiotic for UTI. Oxygen as needed for cough. Tylenol for fever. Follow-up with PCP if no relief. Return with new or worsening symptoms.

## 2025-03-03 NOTE — ED PROVIDER NOTES
Encounter Date: 3/3/2025       History     Chief Complaint   Patient presents with    Fever    Cough     C/o cough and fever that started yesterday. Tested positive for covid this morning. Tylenol taken this morning.      See MDM.     The history is provided by the patient. No  was used.     Review of patient's allergies indicates:   Allergen Reactions    Amiodarone Other (See Comments)    Levofloxacin Other (See Comments)    Red dye     Statins-hmg-coa reductase inhibitors Other (See Comments)    Strawberry     Sulfa (sulfonamide antibiotics)     Hydromorphone Nausea And Vomiting    Morphine Nausea And Vomiting and Rash    Penicillins Rash     Past Medical History:   Diagnosis Date    Cancer     Hypertension      History reviewed. No pertinent surgical history.  No family history on file.  Social History[1]  Review of Systems   Constitutional:  Positive for chills and fever.   Respiratory:  Positive for cough.    Gastrointestinal:  Positive for nausea. Negative for abdominal pain, diarrhea and vomiting.   Genitourinary:  Negative for dysuria and hematuria.   All other systems reviewed and are negative.      Physical Exam     Initial Vitals [03/03/25 1141]   BP Pulse Resp Temp SpO2   (!) 105/59 100 18 99 °F (37.2 °C) 95 %      MAP       --         Physical Exam    Nursing note and vitals reviewed.  Constitutional: She appears well-developed and well-nourished. She is not diaphoretic. No distress.   HENT:   Head: Normocephalic and atraumatic.   Cardiovascular:  Normal rate, regular rhythm and normal heart sounds.     Exam reveals no gallop and no friction rub.       No murmur heard.  Pulmonary/Chest: Breath sounds normal. No respiratory distress. She has no wheezes. She has no rhonchi. She has no rales.     Neurological: She is alert and oriented to person, place, and time.   Skin: Skin is warm and dry.   Psychiatric: She has a normal mood and affect. Her behavior is normal.         ED Course    Procedures  Labs Reviewed   COMPREHENSIVE METABOLIC PANEL - Abnormal       Result Value    Sodium 140      Potassium 4.0      Chloride 106      CO2 25      Glucose 104      Blood Urea Nitrogen 14.0      Creatinine 0.77      Calcium 9.2      Protein Total 7.3      Albumin 3.7      Globulin 3.6 (*)     Albumin/Globulin Ratio 1.0 (*)     Bilirubin Total 0.7      ALP 59      ALT 11      AST 17      eGFR >60      Anion Gap 9.0      BUN/Creatinine Ratio 18     URINALYSIS - Abnormal    Color, UA Yellow      Appearance, UA Clear      Specific Gravity, UA 1.020      pH, UA 5.5      Protein, UA 2+ (*)     Glucose, UA Negative      Ketones, UA Negative      Blood, UA 1+ (*)     Bilirubin, UA Negative      Urobilinogen, UA 0.2      Nitrites, UA Positive (*)     Leukocyte Esterase, UA Negative     CBC WITH DIFFERENTIAL - Abnormal    WBC 7.75      RBC 4.80      Hgb 12.4      Hct 38.5      MCV 80.2      MCH 25.8 (*)     MCHC 32.2 (*)     RDW 14.0      Platelet 172      MPV 10.2      Neut % 76.0      Lymph % 7.9      Mono % 13.2      Eos % 1.5      Basophil % 0.5      Imm Grans % 0.9      Neut # 5.89      Lymph # 0.61      Mono # 1.02      Eos # 0.12      Baso # 0.04      Imm Gran # 0.07 (*)     NRBC% 0.0     URINALYSIS, MICROSCOPIC - Abnormal    Bacteria, UA Moderate (*)     RBC, UA 0-5      WBC, UA None Seen      Squamous Epithelial Cells, UA Rare     CBC W/ AUTO DIFFERENTIAL    Narrative:     The following orders were created for panel order CBC auto differential.  Procedure                               Abnormality         Status                     ---------                               -----------         ------                     CBC with Differential[[id number]]       Abnormal            Final result                 Please view results for these tests on the individual orders.          Imaging Results              X-Ray Chest AP Portable (Final result)  Result time 03/03/25 13:30:30      Final result by Joseph Ferguson  MD LONNIE (03/03/25 13:30:30)                   Impression:      1. Mild cardiomegaly  2. Postsurgical changes at the right chest and hilum with mild elevation of the right hemidiaphragm  3. Atelectasis or scarring left lung base  4. Atherosclerosis      Electronically signed by: Joseph Ferguson  Date:    03/03/2025  Time:    13:30               Narrative:    EXAMINATION:  XR CHEST AP PORTABLE    CLINICAL HISTORY:  , Cough, unspecified.    COMPARISON:  04/06/2023    FINDINGS:  An AP view or more reveals the heart to be mildly enlarged.  The trachea is to the right of midline.  Atherosclerosis seen within the aorta.  Postsurgical changes again noted to the right hilum and right chest.  There is mild elevation of the right hemidiaphragm.  Atelectasis and/or scarring is evident the left lung base.  No definite infiltrate or effusion is seen.  Degenerative changes are noted to the thoracic spine.                                       Medications   dexAMETHasone injection 4 mg (4 mg Intramuscular Given 3/3/25 1214)   acetaminophen tablet 500 mg (500 mg Oral Given 3/3/25 1345)     Medical Decision Making  Pt is a 89 y/o female with hx of HTN, lung cancer s/p RLL resection, COPD who presents with fever since last night. States that she felt very tired last night, so she went to sleep, but was up all night coughing. Used supplemental oxygen that she has at home that resolved the sx. Also had associated fever at home, Tmax 103. Treated with tylenol at home, last dose at 9:15am. Denies abdominal pain, vomiting, diarrhea, urinary symptoms. No known sick contacts. Lives at home with  who is on hospice, has nurses and sitters at home at nearly all times. Did not receive covid vaccines.     Pt not toxic appearing, no acute distress. Vitals stable in ED. No leukocytosis or anemia. CMP unremarkable. CXR without acute changes or abnormalities, pt is s/p RLL resection. Pt does not want to stay in hospital, would prefer to stay at  home. Decadron given in ED. Pt has home oxygen prn. Will send home with steroid and antibiotic to treat minor UTI seen on UA. Instructed on isolation, conservative measures. Strict ED precautions given. Pt and daughter expressed understanding and was in agreement with the plan.     Amount and/or Complexity of Data Reviewed  Independent Historian: caregiver     Details: Pt's daughter was present throughout the visit and helped to provide the history.   Labs: ordered. Decision-making details documented in ED Course.  Radiology: ordered. Decision-making details documented in ED Course.    Risk  OTC drugs.  Prescription drug management.      Additional MDM:   Differential Diagnosis:   Other: The following diagnoses were also considered and will be evaluated: viral uri, influenza and pneumonia.                                   Clinical Impression:  Final diagnoses:  [R05.9] Cough  [U07.1] COVID-19 (Primary)  [N39.0] Urinary tract infection without hematuria, site unspecified          ED Disposition Condition    Discharge Stable          ED Prescriptions       Medication Sig Dispense Start Date End Date Auth. Provider    dexAMETHasone (DECADRON) 4 MG Tab  (Status: Discontinued) Take 1 tablet (4 mg total) by mouth every 12 (twelve) hours. for 5 days 10 tablet 3/3/2025 3/3/2025 Jacque Hallman PA    nitrofurantoin, macrocrystal-monohydrate, (MACROBID) 100 MG capsule  (Status: Discontinued) Take 1 capsule (100 mg total) by mouth 2 (two) times daily. for 5 days 10 capsule 3/3/2025 3/3/2025 Jacque Hallman PA    dexAMETHasone (DECADRON) 4 MG Tab Take 1 tablet (4 mg total) by mouth every 12 (twelve) hours. for 5 days 10 tablet 3/3/2025 3/8/2025 Jacque Hallman PA    nitrofurantoin, macrocrystal-monohydrate, (MACROBID) 100 MG capsule Take 1 capsule (100 mg total) by mouth 2 (two) times daily. for 5 days 10 capsule 3/3/2025 3/8/2025 Jacque Hallman PA          Follow-up Information       Follow up With Specialties Details  Why Contact Info    Ryan Bojorquez MD Family Medicine Call in 1 week  [address] [place] [facility] [address]  [phone]                   [1]   Social History  Tobacco Use    Smoking status: Never    Smokeless tobacco: Never   Substance Use Topics    Alcohol use: Never    Drug use: Never        Jacque Hallman PA  03/03/25 0548